# Patient Record
Sex: FEMALE | Race: WHITE | Employment: FULL TIME | URBAN - METROPOLITAN AREA
[De-identification: names, ages, dates, MRNs, and addresses within clinical notes are randomized per-mention and may not be internally consistent; named-entity substitution may affect disease eponyms.]

---

## 2017-08-09 ENCOUNTER — TELEPHONE (OUTPATIENT)
Dept: ENDOCRINOLOGY | Age: 57
End: 2017-08-09

## 2017-08-09 NOTE — TELEPHONE ENCOUNTER
Received a message from Dr. Pina Dia that pt had a repeat CT scan of her carotids that commented on a 3.7 cm right lobe thyroid nodule. I called and spoke with Dr. Pina Dia about this and told him that I had previously seen her one time in Feb 2014 for this and had serial ultrasounds in Feb 2014, August 2014, and August 2015 that showed the nodule to be stable at 3.4 cm. Given that there has only been 3 mm of growth in 2 years, I do not consider this very significant and do not feel she needs to come back and see me. I asked him to have his office staff call her to tell her that she won't need to be seen and he told me he would do so.

## 2018-02-11 ENCOUNTER — HOSPITAL ENCOUNTER (EMERGENCY)
Age: 58
Discharge: HOME | End: 2018-02-11
Attending: EMERGENCY MEDICINE
Payer: COMMERCIAL

## 2018-02-11 VITALS
OXYGEN SATURATION: 97 % | RESPIRATION RATE: 18 BRPM | HEIGHT: 59 IN | HEART RATE: 66 BPM | WEIGHT: 194.45 LBS | BODY MASS INDEX: 39.2 KG/M2 | DIASTOLIC BLOOD PRESSURE: 41 MMHG | TEMPERATURE: 97.9 F | SYSTOLIC BLOOD PRESSURE: 118 MMHG

## 2018-02-11 DIAGNOSIS — S33.6XXA SPRAIN OF SACROILIAC LIGAMENT, INITIAL ENCOUNTER: Primary | ICD-10-CM

## 2018-02-11 PROCEDURE — 99282 EMERGENCY DEPT VISIT SF MDM: CPT

## 2018-02-11 RX ORDER — CYCLOBENZAPRINE HCL 10 MG
10 TABLET ORAL
Qty: 20 TAB | Refills: 0 | Status: SHIPPED | OUTPATIENT
Start: 2018-02-11 | End: 2018-08-21

## 2018-02-11 RX ORDER — NAPROXEN 500 MG/1
500 TABLET ORAL
Qty: 20 TAB | Refills: 0 | Status: SHIPPED | OUTPATIENT
Start: 2018-02-11 | End: 2018-08-21

## 2018-02-11 NOTE — ED PROVIDER NOTES
EMERGENCY DEPARTMENT HISTORY AND PHYSICAL EXAM      Date: 2/11/2018  Patient Name: Kati Arenas    History of Presenting Illness     Chief Complaint   Patient presents with    Back Pain     lower back pain \"it feels like a pinched nerve\" onset yesterday       History Provided By: Patient    HPI: Kati Arenas, 59 y.o. female with PMHx significant for Arthritis, PVD, and HTN, presents ambulatory to the ED with cc of lower back pain since 9:00 AM yesterday morning. Pt states that her pain is exacerbated with breathing. Pt reports that she has been taking Hydrocodone which has helped to alleviate her symptoms, but she notes that she ran out of the medication. Pt explains that she was at work yesterday when she began to experience a gradual onset of lower back pain. Pt notes that she moved a heavy piece of furniture around her home approximately 3 days ago, but she denies any other recent strenuous activity. She specifically denies any fevers, chills, nausea, vomiting, chest pain, shortness of breath, headache, rash, diarrhea, sweating or weight loss. PCP: Nicholas Pope MD    There are no other complaints, changes, or physical findings at this time. Current Outpatient Prescriptions   Medication Sig Dispense Refill    naproxen (NAPROSYN) 500 mg tablet Take 1 Tab by mouth every twelve (12) hours as needed for Pain. 20 Tab 0    cyclobenzaprine (FLEXERIL) 10 mg tablet Take 1 Tab by mouth three (3) times daily as needed for Muscle Spasm(s). 20 Tab 0    oxyCODONE-acetaminophen (PERCOCET) 5-325 mg per tablet Take 1 Tab by mouth every six (6) hours as needed for Pain. Max Daily Amount: 4 Tabs. 10 Tab 0    methylPREDNISolone (MEDROL, TRACE,) 4 mg tablet Take as directed 1 Dose Pack 0    lisinopril (PRINIVIL, ZESTRIL) 10 mg tablet Take  by mouth daily.  CALCIUM CARBONATE (CALCIUM 600 PO) Take 1,200 mg by mouth daily.  cholecalciferol (VITAMIN D3) 1,000 unit tablet Take  by mouth daily.       melatonin 3 mg tablet Take  by mouth. 1/2 tablet qhs      ACETAMINOPHEN/DIPHENHYDRAMINE (TYLENOL PM PO) Take 1 Tab by mouth nightly as needed.  isosorbide mononitrate ER (IMDUR) 30 mg tablet Take  by mouth daily.  atorvastatin (LIPITOR) 80 mg tablet Take 80 mg by mouth daily.  metoprolol-XL (TOPROL XL) 50 mg XL tablet Take 50 mg by mouth daily.  aspirin (ASPIRIN) 325 mg tablet Take 325 mg by mouth daily.            Past History     Past Medical History:  Past Medical History:   Diagnosis Date    Arrhythmia     Arthritis     CAD (coronary artery disease)     GERD (gastroesophageal reflux disease)     Heart failure (HCC)     Hypertension     Other ill-defined conditions(799.89)     epidural injections    Psychiatric disorder     anxiety/depression    PVD (peripheral vascular disease) ([facility] [address]     Right thyroid nodule 1/22/2014       Past Surgical History:  Past Surgical History:   Procedure Laterality Date    CABG, ARTERY-VEIN, THREE  Nov 2010    COLONOSCOPY,DIAGNOSTIC  3/6/2015         HX BACK SURGERY      microdiskectomy 2009 L4-L5, and in 2010    HX GYN      D & C    HX GYN      c sections    HX HEENT      septoplasty    HX HYSTEROSCOPY WITH ENDOMETRIAL ABLATION      HX IMPLANTABLE CARDIOVERTER DEFIBRILLATOR  Feb 2011    HX ORTHOPAEDIC      surgery on right hand with tendon cut and repaired    HX OTHER SURGICAL      angioplasty-left iliac    HX OTHER SURGICAL      EGD-Anand's Esophagus    HX TONSILLECTOMY         Family History:  Family History   Problem Relation Age of Onset    Arthritis-rheumatoid Mother     Elevated Lipids Mother     Thyroid Disease Mother      hypothyroidism    Alcohol abuse Father     Elevated Lipids Father     Stroke Father     Heart Attack Brother 50    Heart Disease Maternal Grandmother     Hypertension Maternal Grandmother     Thyroid Disease Sister      hypothyroidism       Social History:  Social History   Substance Use Topics    Smoking status: Former Smoker     Packs/day: 1.00     Years: 37.00     Quit date: 1/1/2010    Smokeless tobacco: Never Used    Alcohol use No       Allergies:  No Known Allergies      Review of Systems   Review of Systems   Constitutional: Negative. Negative for activity change, appetite change, chills, fatigue, fever and unexpected weight change. HENT: Negative. Negative for congestion, hearing loss, rhinorrhea, sneezing and voice change. Eyes: Negative. Negative for pain and visual disturbance. Respiratory: Negative. Negative for apnea, cough, choking, chest tightness and shortness of breath. Cardiovascular: Negative. Negative for chest pain and palpitations. Gastrointestinal: Negative. Negative for abdominal distention, abdominal pain, blood in stool, diarrhea, nausea and vomiting. Genitourinary: Negative. Negative for difficulty urinating, flank pain, frequency and urgency. No discharge   Musculoskeletal: Positive for back pain. Negative for arthralgias, myalgias and neck stiffness. Skin: Negative. Negative for color change and rash. Neurological: Negative. Negative for dizziness, seizures, syncope, speech difficulty, weakness, numbness and headaches. Hematological: Negative for adenopathy. Psychiatric/Behavioral: Negative. Negative for agitation, behavioral problems, dysphoric mood and suicidal ideas. The patient is not nervous/anxious. Physical Exam   Physical Exam   Constitutional: She is oriented to person, place, and time. She appears well-developed and well-nourished. No distress. HENT:   Head: Normocephalic and atraumatic. Mouth/Throat: Oropharynx is clear and moist. No oropharyngeal exudate. Eyes: Conjunctivae and EOM are normal. Pupils are equal, round, and reactive to light. Right eye exhibits no discharge. Left eye exhibits no discharge. Neck: Normal range of motion. Neck supple. Cardiovascular: Normal rate, regular rhythm and intact distal pulses.   Exam reveals no gallop and no friction rub. No murmur heard. Pulmonary/Chest: Effort normal and breath sounds normal. No respiratory distress. She has no wheezes. She has no rales. She exhibits no tenderness. Abdominal: Soft. Bowel sounds are normal. She exhibits no distension and no mass. There is no tenderness. There is no rebound and no guarding. Musculoskeletal: Normal range of motion. She exhibits no edema. TTP over the SI joints   Lymphadenopathy:     She has no cervical adenopathy. Neurological: She is alert and oriented to person, place, and time. No cranial nerve deficit. Coordination normal.   Skin: Skin is warm and dry. No rash noted. No erythema. Psychiatric: She has a normal mood and affect. Nursing note and vitals reviewed. Medical Decision Making   I am the first provider for this patient. I reviewed the vital signs, available nursing notes, past medical history, past surgical history, family history and social history. Vital Signs-Reviewed the patient's vital signs. Patient Vitals for the past 12 hrs:   Temp Pulse Resp BP SpO2   02/11/18 1226 97.9 °F (36.6 °C) 66 18 118/41 97 %       Pulse Oximetry Analysis - 97% on RA    Cardiac Monitor:   Rate: 66 bpm  Rhythm: Normal Sinus Rhythm     Records Reviewed: Nursing Notes, Old Medical Records, Previous Radiology Studies and Previous Laboratory Studies    Provider Notes (Medical Decision Making):   DDx: strain, sprain, SI joint dysfunction    ED Course:   Initial assessment performed. The patients presenting problems have been discussed, and they are in agreement with the care plan formulated and outlined with them. I have encouraged them to ask questions as they arise throughout their visit. Disposition:  1:16 PM  Maddy Cardoso's  results have been reviewed with her. She has been counseled regarding her diagnosis.   She verbally conveys understanding and agreement of the signs, symptoms, diagnosis, treatment and prognosis and additionally agrees to follow up as recommended with Dr. Dorothy Zelaya MD in 24 - 48 hours. She also agrees with the care-plan and conveys that all of her questions have been answered. I have also put together some discharge instructions for her that include: 1) educational information regarding their diagnosis, 2) how to care for their diagnosis at home, as well a 3) list of reasons why they would want to return to the ED prior to their follow-up appointment, should their condition change. PLAN:  1. Discharge Medication List as of 2/11/2018  1:16 PM      START taking these medications    Details   naproxen (NAPROSYN) 500 mg tablet Take 1 Tab by mouth every twelve (12) hours as needed for Pain., Normal, Disp-20 Tab, R-0      cyclobenzaprine (FLEXERIL) 10 mg tablet Take 1 Tab by mouth three (3) times daily as needed for Muscle Spasm(s). , Normal, Disp-20 Tab, R-0         CONTINUE these medications which have NOT CHANGED    Details   oxyCODONE-acetaminophen (PERCOCET) 5-325 mg per tablet Take 1 Tab by mouth every six (6) hours as needed for Pain. Max Daily Amount: 4 Tabs., Print, Disp-10 Tab, R-0      methylPREDNISolone (MEDROL, TRACE,) 4 mg tablet Take as directed, Print, Disp-1 Dose Pack, R-0      lisinopril (PRINIVIL, ZESTRIL) 10 mg tablet Take  by mouth daily. , Historical Med      CALCIUM CARBONATE (CALCIUM 600 PO) Take 1,200 mg by mouth daily. , Historical Med      cholecalciferol (VITAMIN D3) 1,000 unit tablet Take  by mouth daily. , Historical Med      melatonin 3 mg tablet Take  by mouth. 1/2 tablet qhs, Historical Med      ACETAMINOPHEN/DIPHENHYDRAMINE (TYLENOL PM PO) Take 1 Tab by mouth nightly as needed., Historical Med      isosorbide mononitrate ER (IMDUR) 30 mg tablet Take  by mouth daily. , Historical Med      atorvastatin (LIPITOR) 80 mg tablet Take 80 mg by mouth daily. , Historical Med      metoprolol-XL (TOPROL XL) 50 mg XL tablet Take 50 mg by mouth daily.   , Historical Med      aspirin (ASPIRIN) 325 mg tablet Take 325 mg by mouth daily. , Historical Med           2. Follow-up Information     Follow up With Details Comments Contact Madelyn Liao MD Call in 2 days As needed, If symptoms worsen [place] [address]  [phone]          Return to ED if worse     Diagnosis     Clinical Impression:   1. Sprain of sacroiliac ligament, initial encounter        Attestations: This note is prepared by Summer Gill, acting as Scribe for [address] Brennan Swanson, [address] [facility] Brennan Swanson MD: The scribe's documentation has been prepared under my direction and personally reviewed by me in its entirety. I confirm that the note above accurately reflects all work, treatment, procedures, and medical decision making performed by me.

## 2018-02-11 NOTE — ED NOTES
Pt. States she hurt her back that started yesterday at 0900 and continues to get worse. Pt. Picked up a piece of furniture on Friday   Pt. Took hydrocodone (from previous back issues) yesterday at 1400 and took anotherone at 0300.

## 2018-02-11 NOTE — DISCHARGE INSTRUCTIONS
Sacroiliac Joint Pain: Care Instructions  Your Care Instructions    The sacroiliac joints connect the spine and each side of the pelvis. These joints bear the weight and stress of your torso. This makes them easy to injure. Injury or overuse of these joints may cause low back pain. Stress on these joints can cause joint pain. Sacroiliac joint pain is more common in pregnant women. Certain kinds of arthritis also may cause this type of joint pain. Home treatment may help you feel better. So can avoiding activities that stress your back. Your doctor also may recommend physical therapy. This may include doing exercises and stretches to help with pain. You may also learn to use good posture. Follow-up care is a key part of your treatment and safety. Be sure to make and go to all appointments, and call your doctor if you are having problems. It's also a good idea to know your test results and keep a list of the medicines you take. How can you care for yourself at home? · Ask your doctor about light exercises that may help your back pain. Try to do light activity throughout the day. But make sure to take rests as needed. Find a comfortable position for rest, but don't stay in one position for too long. Avoid activities that cause pain. · To apply heat, put a warm water bottle, a heating pad set on low, or a warm cloth on your back. Do not go to sleep with a heating pad on your skin. · Put ice or a cold pack on your back for 10 to 20 minutes at a time. Put a thin cloth between the ice and your skin. · If the doctor gave you a prescription medicine for pain, take it as prescribed. · If you are not taking a prescription pain medicine, ask your doctor if you can take an over-the-counter pain medicine, such as acetaminophen (Tylenol), ibuprofen (Advil, Motrin), or naproxen (Aleve). Read and follow all instructions on the label. Take pain medicines exactly as directed.   · Do not take two or more pain medicines at the same time unless the doctor told you to. Many pain medicines have acetaminophen, which is Tylenol. Too much acetaminophen (Tylenol) can be harmful. · To prevent future back pain, do exercises to stretch and strengthen your back and stomach. Learn how to use good posture, safe lifting techniques, and proper body mechanics. When should you call for help? Call 911 anytime you think you may need emergency care. For example, call if:  ? · You are unable to move a leg at all. ?Call your doctor now or seek immediate medical care if:  ? · You have new or worse symptoms in your legs or buttocks. Symptoms may include:  ¨ Numbness or tingling. ¨ Weakness. ¨ Pain. ? · You lose bladder or bowel control. ? Watch closely for changes in your health, and be sure to contact your doctor if:  ? · You are not getting better as expected. Where can you learn more? Go to http://christin-bella.info/. Enter U931 in the search box to learn more about \"Sacroiliac Joint Pain: Care Instructions. \"  Current as of: March 21, 2017  Content Version: 11.4  © 7113-2592 [employer]. Care instructions adapted under license by [employer] (which disclaims liability or warranty for this information). If you have questions about a medical condition or this instruction, always ask your healthcare professional. [address] any warranty or liability for your use of this information.

## 2018-08-21 ENCOUNTER — HOSPITAL ENCOUNTER (OUTPATIENT)
Dept: NON INVASIVE DIAGNOSTICS | Age: 58
Discharge: HOME | End: 2018-08-21
Attending: INTERNAL MEDICINE | Admitting: INTERNAL MEDICINE
Payer: COMMERCIAL

## 2018-08-21 ENCOUNTER — APPOINTMENT (OUTPATIENT)
Dept: GENERAL RADIOLOGY | Age: 58
End: 2018-08-21
Attending: INTERNAL MEDICINE
Payer: COMMERCIAL

## 2018-08-21 VITALS
OXYGEN SATURATION: 95 % | HEIGHT: 59 IN | DIASTOLIC BLOOD PRESSURE: 32 MMHG | SYSTOLIC BLOOD PRESSURE: 106 MMHG | RESPIRATION RATE: 14 BRPM | TEMPERATURE: 98.5 F | WEIGHT: 190 LBS | BODY MASS INDEX: 38.3 KG/M2 | HEART RATE: 71 BPM

## 2018-08-21 PROBLEM — Z95.0 CARDIAC PACEMAKER: Status: ACTIVE | Noted: 2018-08-21

## 2018-08-21 PROCEDURE — C1894 INTRO/SHEATH, NON-LASER: HCPCS

## 2018-08-21 PROCEDURE — 77030039266 HC ADH SKN EXOFIN S2SG -A

## 2018-08-21 PROCEDURE — 74011250636 HC RX REV CODE- 250/636

## 2018-08-21 PROCEDURE — 77030018729 HC ELECTRD DEFIB PAD CARD -B

## 2018-08-21 PROCEDURE — 77030028698 HC BLD TISS PLSM MEDT -D

## 2018-08-21 PROCEDURE — 77030031139 HC SUT VCRL2 J&J -A

## 2018-08-21 PROCEDURE — C1892 INTRO/SHEATH,FIXED,PEEL-AWAY: HCPCS

## 2018-08-21 PROCEDURE — 71045 X-RAY EXAM CHEST 1 VIEW: CPT

## 2018-08-21 PROCEDURE — 77030002996 HC SUT SLK J&J -A

## 2018-08-21 PROCEDURE — 74011000250 HC RX REV CODE- 250

## 2018-08-21 PROCEDURE — 77030018836 HC SOL IRR NACL ICUM -A

## 2018-08-21 PROCEDURE — 77030018673

## 2018-08-21 PROCEDURE — 33207 INSERT HEART PM VENTRICULAR: CPT

## 2018-08-21 PROCEDURE — C1785 PMKR, DUAL, RATE-RESP: HCPCS

## 2018-08-21 RX ORDER — MIDAZOLAM HYDROCHLORIDE 1 MG/ML
1-5 INJECTION, SOLUTION INTRAMUSCULAR; INTRAVENOUS
Status: DISCONTINUED | OUTPATIENT
Start: 2018-08-21 | End: 2018-08-21

## 2018-08-21 RX ORDER — SODIUM CHLORIDE 0.9 % (FLUSH) 0.9 %
5-10 SYRINGE (ML) INJECTION EVERY 8 HOURS
Status: DISCONTINUED | OUTPATIENT
Start: 2018-08-21 | End: 2018-08-22 | Stop reason: HOSPADM

## 2018-08-21 RX ORDER — FENTANYL CITRATE 50 UG/ML
12.5-5 INJECTION, SOLUTION INTRAMUSCULAR; INTRAVENOUS
Status: DISCONTINUED | OUTPATIENT
Start: 2018-08-21 | End: 2018-08-21

## 2018-08-21 RX ORDER — CEPHALEXIN 500 MG/1
500 CAPSULE ORAL 3 TIMES DAILY
Qty: 15 CAP | Refills: 0 | Status: SHIPPED | OUTPATIENT
Start: 2018-08-21 | End: 2018-08-26

## 2018-08-21 RX ORDER — VALSARTAN 40 MG/1
50 TABLET ORAL 2 TIMES DAILY
COMMUNITY
End: 2019-05-30

## 2018-08-21 RX ORDER — OXYCODONE AND ACETAMINOPHEN 5; 325 MG/1; MG/1
1 TABLET ORAL
Status: DISCONTINUED | OUTPATIENT
Start: 2018-08-21 | End: 2018-08-22 | Stop reason: HOSPADM

## 2018-08-21 RX ORDER — ONDANSETRON 2 MG/ML
4 INJECTION INTRAMUSCULAR; INTRAVENOUS
Status: DISCONTINUED | OUTPATIENT
Start: 2018-08-21 | End: 2018-08-22 | Stop reason: HOSPADM

## 2018-08-21 RX ORDER — CEFAZOLIN SODIUM/WATER 2 G/20 ML
SYRINGE (ML) INTRAVENOUS
Status: COMPLETED
Start: 2018-08-21 | End: 2018-08-21

## 2018-08-21 RX ORDER — HEPARIN SODIUM 200 [USP'U]/100ML
INJECTION, SOLUTION INTRAVENOUS
Status: COMPLETED
Start: 2018-08-21 | End: 2018-08-21

## 2018-08-21 RX ORDER — MIDAZOLAM HYDROCHLORIDE 1 MG/ML
INJECTION, SOLUTION INTRAMUSCULAR; INTRAVENOUS
Status: COMPLETED
Start: 2018-08-21 | End: 2018-08-21

## 2018-08-21 RX ORDER — CEFAZOLIN SODIUM/WATER 2 G/20 ML
2 SYRINGE (ML) INTRAVENOUS
Status: COMPLETED | OUTPATIENT
Start: 2018-08-21 | End: 2018-08-21

## 2018-08-21 RX ORDER — FENTANYL CITRATE 50 UG/ML
INJECTION, SOLUTION INTRAMUSCULAR; INTRAVENOUS
Status: COMPLETED
Start: 2018-08-21 | End: 2018-08-21

## 2018-08-21 RX ORDER — BACITRACIN 50000 [IU]/1
INJECTION, POWDER, FOR SOLUTION INTRAMUSCULAR
Status: COMPLETED
Start: 2018-08-21 | End: 2018-08-21

## 2018-08-21 RX ORDER — SODIUM CHLORIDE 0.9 % (FLUSH) 0.9 %
5-10 SYRINGE (ML) INJECTION AS NEEDED
Status: DISCONTINUED | OUTPATIENT
Start: 2018-08-21 | End: 2018-08-22 | Stop reason: HOSPADM

## 2018-08-21 RX ORDER — ACETAMINOPHEN 325 MG/1
650 TABLET ORAL
Status: DISCONTINUED | OUTPATIENT
Start: 2018-08-21 | End: 2018-08-22 | Stop reason: HOSPADM

## 2018-08-21 RX ORDER — LIDOCAINE HYDROCHLORIDE AND EPINEPHRINE 10; 10 MG/ML; UG/ML
INJECTION, SOLUTION INFILTRATION; PERINEURAL
Status: COMPLETED
Start: 2018-08-21 | End: 2018-08-21

## 2018-08-21 RX ORDER — LIDOCAINE HYDROCHLORIDE AND EPINEPHRINE 10; 10 MG/ML; UG/ML
1-20 INJECTION, SOLUTION INFILTRATION; PERINEURAL
Status: DISCONTINUED | OUTPATIENT
Start: 2018-08-21 | End: 2018-08-21

## 2018-08-21 RX ORDER — HEPARIN SODIUM 200 [USP'U]/100ML
500 INJECTION, SOLUTION INTRAVENOUS ONCE
Status: COMPLETED | OUTPATIENT
Start: 2018-08-21 | End: 2018-08-21

## 2018-08-21 RX ORDER — BACITRACIN 50000 [IU]/1
50000 INJECTION, POWDER, FOR SOLUTION INTRAMUSCULAR ONCE
Status: COMPLETED | OUTPATIENT
Start: 2018-08-21 | End: 2018-08-21

## 2018-08-21 RX ADMIN — MIDAZOLAM HYDROCHLORIDE 2 MG: 1 INJECTION, SOLUTION INTRAMUSCULAR; INTRAVENOUS at 15:48

## 2018-08-21 RX ADMIN — HEPARIN SODIUM 1000 UNITS: 200 INJECTION, SOLUTION INTRAVENOUS at 16:22

## 2018-08-21 RX ADMIN — BACITRACIN 50000 UNITS: 5000 INJECTION, POWDER, FOR SOLUTION INTRAMUSCULAR at 16:21

## 2018-08-21 RX ADMIN — LIDOCAINE HYDROCHLORIDE AND EPINEPHRINE 20 MG: 10; 10 INJECTION, SOLUTION INFILTRATION; PERINEURAL at 16:02

## 2018-08-21 RX ADMIN — FENTANYL CITRATE 25 MCG: 50 INJECTION, SOLUTION INTRAMUSCULAR; INTRAVENOUS at 16:22

## 2018-08-21 RX ADMIN — MIDAZOLAM HYDROCHLORIDE 2 MG: 1 INJECTION, SOLUTION INTRAMUSCULAR; INTRAVENOUS at 16:05

## 2018-08-21 RX ADMIN — FENTANYL CITRATE 25 MCG: 50 INJECTION, SOLUTION INTRAMUSCULAR; INTRAVENOUS at 16:06

## 2018-08-21 RX ADMIN — MIDAZOLAM HYDROCHLORIDE 2 MG: 1 INJECTION, SOLUTION INTRAMUSCULAR; INTRAVENOUS at 16:22

## 2018-08-21 RX ADMIN — Medication 2 G: at 15:45

## 2018-08-21 RX ADMIN — MIDAZOLAM 2 MG: 1 INJECTION INTRAMUSCULAR; INTRAVENOUS at 15:48

## 2018-08-21 RX ADMIN — FENTANYL CITRATE 50 MCG: 50 INJECTION, SOLUTION INTRAMUSCULAR; INTRAVENOUS at 15:49

## 2018-08-21 RX ADMIN — BACITRACIN 50000 UNITS: 50000 INJECTION, POWDER, FOR SOLUTION INTRAMUSCULAR at 16:21

## 2018-08-21 NOTE — H&P
[address]  HISTORY AND PHYSICAL      Jakub Ordoñez  MR#: [id number]  : 1960  ACCOUNT #: [de-identified]   ADMIT DATE: 2018    HISTORY OF PRESENT ILLNESS:  This is a 78-year-old female with a history of a chronic ischemic cardiomyopathy, remote coronary artery bypass grafting, hypertension, dyslipidemia, peripheral vascular disease, who is here for further evaluation and management. I last saw her in the office in July, at which point her defibrillator battery was running low. She had a prophylactic defibrillator implanted in . Her ejection fraction was around 30% at that time. In  prior to coronary artery bypass grafting, her ejection fraction was 10%. By 2017, her ejection fraction micaela to 50% and most recently by echo 60% prior to this procedure. She has not had any malignant ventricular arrhythmias. ALLERGIES:  NO KNOWN DRUG ALLERGIES. MEDICATIONS:  Please see the list.    FAMILY HISTORY:  Noncontributory. SOCIAL HISTORY:  . Former smoker. REVIEW OF SYSTEMS:  Noncontributory. PHYSICAL EXAMINATION:  VITAL SIGNS:  Stable. GENERAL:  She is awake, alert. Grossly nonfocal.    CHEST:  The left ICD chest site looks good. No erosion. TELEMETRY:  Atrial pacing. IMPRESSION:  1. Chronic ischemic cardiomyopathy with recovery of function documented by echo. Her ejection fraction is 60%. At its lowest in  prior to bypass grafting, it was 10%. 2.  As above. RECOMMENDATIONS:  Given the potential benefits, risks, and alternatives, she agrees to proceed with device generator change which will involve downgrading from a defibrillator to a pacemaker. She has no evident need for a prophylactic defibrillator at this time but does use atrial pacing for sinus node dysfunction. MD NICKOLAS Calvo/  D: 2018 15:52     T: 2018 16:22  JOB #: [id number]  CC: Diogo Brown MD

## 2018-08-21 NOTE — PROGRESS NOTES
Cardiac Cath Lab Recovery Arrival Note:      Mikell Gitelman arrived to Cardiac Cath Lab, Recovery Area. Staff introduced to patient. Patient identifiers verified with NAME and DATE OF BIRTH. Procedure verified with patient. Consent forms reviewed and signed by patient or authorized representative and verified. Allergies verified. Patient and family oriented to department. Patient and family informed of procedure and plan of care. Questions answered with review. Patient prepped for procedure, per orders from physician, prior to arrival.    Patient on cardiac monitor, non-invasive blood pressure, SPO2 monitor. On RA. Patient is A&Ox 4. Patient reports no complaints. Patient in stretcher, in low position, with side rails up, call bell within reach, patient instructed to call if assistance as needed. Patient prep in: [address], [place] [address].    Prep by: Suma Duarte

## 2018-08-21 NOTE — PROGRESS NOTES
S/p ICD removal and pacer gen placement due to battery depletion, sick sinus syndrome, recovered EF to 60%.

## 2018-08-21 NOTE — IP AVS SNAPSHOT
[address] [facility] 
[phone] Patient: Madhu Conroy MRN: [id number] VWT:7/30/4094 About your hospitalization You were admitted on:  August 21, 2018 You last received care in the:  MRM 2 INTRVNTNL CARDIO You were discharged on:  August 21, 2018 Why you were hospitalized Your primary diagnosis was:  Not on File Your diagnoses also included:  Cardiac Pacemaker Follow-up Information Follow up With Details Comments Contact Info [address], [address] [facility] [address] [phone] Yesica Cobian MD  Please make appointment in 2-4 weeks with Dr. Chelsea Oakley nurse, [place]. [address] [facility] 
[phone] Discharge Orders None A check dionne indicates which time of day the medication should be taken. My Medications START taking these medications Instructions Each Dose to Equal  
 Morning Noon Evening Bedtime  
 cephALEXin 500 mg capsule Commonly known as:  Vickie Rota Your last dose was: Your next dose is: Take 1 Cap by mouth three (3) times daily for 5 days. 500 mg CONTINUE taking these medications Instructions Each Dose to Equal  
 Morning Noon Evening Bedtime  
 aspirin 325 mg tablet Commonly known as:  ASPIRIN Your last dose was: Your next dose is: Take 325 mg by mouth daily. 325 mg CALCIUM 600 PO Your last dose was: Your next dose is: Take 1,200 mg by mouth daily. 1200 mg  
    
   
   
   
  
 isosorbide mononitrate ER 30 mg tablet Commonly known as:  IMDUR Your last dose was: Your next dose is: Take  by mouth daily. LIPITOR 80 mg tablet Generic drug:  atorvastatin Your last dose was: Your next dose is: Take 80 mg by mouth daily. 80 mg  
    
   
   
   
  
 melatonin 3 mg tablet Your last dose was: Your next dose is: Take  by mouth. 1/2 tablet qhs  
     
   
   
   
  
 TOPROL XL 50 mg XL tablet Generic drug:  metoprolol succinate Your last dose was: Your next dose is: Take 50 mg by mouth daily. 50 mg  
    
   
   
   
  
 TYLENOL PM PO Your last dose was: Your next dose is: Take 1 Tab by mouth nightly as needed. 1 Tab  
    
   
   
   
  
 valsartan 40 mg tablet Commonly known as:  DIOVAN Your last dose was: Your next dose is: Take 50 mg by mouth two (2) times a day. Indications: entresto 49/51 bid 50 mg  
    
   
   
   
  
 VITAMIN D3 1,000 unit tablet Generic drug:  cholecalciferol Your last dose was: Your next dose is: Take  by mouth daily. STOP taking these medications   
 cyclobenzaprine 10 mg tablet Commonly known as:  FLEXERIL  
   
  
 lisinopril 10 mg tablet Commonly known as:  PRINIVIL, ZESTRIL  
   
  
 naproxen 500 mg tablet Commonly known as:  NAPROSYN  
   
  
 oxyCODONE-acetaminophen 5-325 mg per tablet Commonly known as:  PERCOCET Where to Get Your Medications These medications were sent to [facility], [address] [place] [address] [address] [address] Phone:  [phone]  
  cephALEXin 500 mg capsule Discharge Instructions None Introducing [facility] [place] [facility]! Dear Naveed Cazares: Thank you for requesting a [employer] account. Our records indicate that you already have an active [employer] account. You can access your account anytime at [email or web address] Did you know that you can access your hospital and ER discharge instructions at any time in [employer]? You can also review all of your test results from your hospital stay or ER visit. Additional Information If you have questions, please visit the Frequently Asked Questions section of the Andre Phillipet website at [email or web address] Remember, Andre Phillipet is NOT to be used for urgent needs. For medical emergencies, dial 911. Now available from your iPhone and Android! Introducing Yg Carmichael As a [facility] patient, I wanted to make you aware of our electronic visit tool called Yg Carmichael. RodriguezPIST[employer] Consult 24/7 allows you to connect within minutes with a medical provider 24 hours a day, seven days a week via a mobile device or tablet or logging into a secure website from your computer. You can access Yg Carmichael from anywhere in the [place]. A virtual visit might be right for you when you have a simple condition and feel like you just dont want to get out of bed, or cant get away from work for an appointment, when your regular [facility] provider is not available (evenings, weekends or holidays), or when youre out of town and need minor care. Electronic visits cost only $49 and if the RodriguezPISTIS Consult 24/[employer] provider determines a prescription is needed to treat your condition, one can be electronically transmitted to a nearby pharmacy*. Please take a moment to enroll today if you have not already done so. The enrollment process is free and takes just a few minutes. To enroll, please download the Clinical Insight 24/[employer] paulette to your tablet or phone, or visit [email or web address] to enroll on your computer. And, as an [address] [place] [address] patient with a [facility] account, the results of your visits will be scanned into your electronic medical record and your primary care provider will be able to view the scanned results.    
We urge you to continue to see your regular [facility] provider for your ongoing medical care. And while your primary care provider may not be the one available when you seek a Yg Navarrokavyafin virtual visit, the peace of mind you get from getting a real diagnosis real time can be priceless. For more information on Yg Navarrokavyafin, view our Frequently Asked Questions (FAQs) at [email or web address]. Sincerely, 
 
Manuel Gama MD 
Chief Medical Officer [employer] *:  certain medications cannot be prescribed via Yg Navarrodaisy Providers Seen During Your Hospitalization Provider Specialty Primary office phone Juliet Penn MD Cardiology [phone] Your Primary Care Physician (PCP) Primary Care Physician Office Phone Office Fax Fahad Girardangelique [phone] You are allergic to the following No active allergies Recent Documentation Height Weight BMI OB Status Smoking Status 1.499 m 86.2 kg 38.38 kg/m2 Postmenopausal Former Smoker Emergency Contacts Name Discharge Info Relation Home Work Mobile Rajendra Cardoso DISCHARGE CAREGIVER [3] Spouse [3] [phone] [address] [facility] [address] CAREGIVER [3] Daughter [21] [phone] Patient Belongings The following personal items are in your possession at time of discharge: 
  Dental Appliances: None         Home Medications: None   Jewelry: Ring, With patient  Clothing: Footwear, Pants, Shirt, Undergarments    Other Valuables: None Please provide this summary of care documentation to your next provider. Signatures-by signing, you are acknowledging that this After Visit Summary has been reviewed with you and you have received a copy. Patient Signature:  ____________________________________________________________ Date:  ____________________________________________________________  
  
Aloma Snellen  Provider Signature: ____________________________________________________________ Date:  ____________________________________________________________

## 2018-08-21 NOTE — PROCEDURES
[address]  [phone]    Patient ID:  Patient: Marco Antonio Sanchez  MRN: [id number]  Age: 59 y.o.  : 1960  Gender: female  Study Date: 2018    History: This is a female with nonreversible sick sinus syndrome with bradycardia requiring pacing, and a dual chamber ICD now with battery depletion. She is here for ICD generator removal and replacement with a dual chamber pacemaker generator. She no longer needs a prophylactic ICD since she has had an improvement in ejection fraction to 60% but does require pacing. Procedures Performed:  1. DUAL CHAMBER PACEMAKER Generator Insertion (83762)  2. ICD generator removal (15317)    The patient was brought to the EP lab in a postabsorptive state after informed consent had been previously obtained. Continuous electrocardiographic and hemodynamic monitoring was performed. Sedation was performed by the EP nurse who was in constant attendance and my supervision throughout the procedure using Versed and fentanyl, sedation time 30 minutes. Using 1% lidocaine with epinephrine, the left chest site was anesthetized at the previous incision scar. The pocket was opened in the usual fashion and using blunt dissection, minimal scar was removed. The old ICD generator was ultimately delivered from the pocket after disconnecting from the leads. Each lead was tested and performance verified. Hemostasis was obtained using a Plasma Blade at recommended settings. The new pacemaker pulse generator was connected to the right atrial and right ventricular leads. The SVC and RV DF-1 portions were capped, each cap secured with 0-silk suture ties. The leads and caps were coiled under the generator and placed in the pocket. Vigorous irrigation with antibiotic solution was performed.      The pocket was closed using a running 2-0 Vicryl layer x1, followed by a more superficial layer of running 4-0 Vicryl in a subcuticular fashion to close the skin. Dermabond was applied. Preoperative Diagnosis: As above. Postoperative Diagnosis: As above. Procedure:  As above. Surgeon(s) and Role:  Skylar Reyes MD - Primary   Anesthesia:   MAC. Estimated Blood Loss:  <5 cc. Specimens: * No specimens in log *   Findings:  As below. Complications:  None. SETTINGS:  Medtronic [id number], [id number]  RA 1.9, 1.0, 532, chronic 5075-45 cm  RV 15.5, 1.0, 475, chronic 6947-58 cm  AAI-DDD       Explanted generator:  Medtronic [id number], [id number], implanted 2/3/2011       Recommendations:  After successful ICD generator removal and dual chamber pacemaker generator insertion, routine follow-up in 2-4 weeks for wound and device check.       Signed By: Skinny Lunsford MD     August 21, 2018

## 2018-08-21 NOTE — ROUTINE PROCESS
RN walked in as patient was coming back from restroom without assistance. She had been educated on bedrest and its purposes, and that it ended at 2045. I re-educated the pt and her family member on the need for and purpose of bedrest. Site CDI, no complaints.

## 2018-08-21 NOTE — PROGRESS NOTES
Doing well post-procedure. Home today with F/U routinely in the office. All questions answered. Cephalexin 500 tid x 5 days, e-rx'd.

## 2018-08-22 NOTE — PROGRESS NOTES
RN have reviewed medications and side effects with pt. Regarding new prescription. Discharge instruction reviewed with pt. And family. Pt. Demonstrates understanding. Allowed adequate time to ask questions, all questions answered. Printed copy of AVS give to pt. All belongings gathered. IV and Tele discontinue. Transported pt. Via wheelchair to main entrance and into car of family.

## 2019-03-01 ENCOUNTER — APPOINTMENT (OUTPATIENT)
Dept: BONE DENSITY | Age: 59
End: 2019-03-01
Attending: NURSE PRACTITIONER
Payer: COMMERCIAL

## 2019-03-01 ENCOUNTER — HOSPITAL ENCOUNTER (OUTPATIENT)
Dept: MAMMOGRAPHY | Age: 59
Discharge: HOME | End: 2019-03-01
Attending: FAMILY MEDICINE
Payer: COMMERCIAL

## 2019-03-01 ENCOUNTER — HOSPITAL ENCOUNTER (OUTPATIENT)
Dept: BONE DENSITY | Age: 59
Discharge: HOME | End: 2019-03-01
Attending: NURSE PRACTITIONER
Payer: COMMERCIAL

## 2019-03-01 DIAGNOSIS — Z78.0 MENOPAUSE: ICD-10-CM

## 2019-03-01 DIAGNOSIS — M85.80 OSTEOPENIA: ICD-10-CM

## 2019-03-01 DIAGNOSIS — Z12.39 SCREENING BREAST EXAMINATION: ICD-10-CM

## 2019-03-01 PROCEDURE — 77080 DXA BONE DENSITY AXIAL: CPT

## 2019-03-01 PROCEDURE — 77067 SCR MAMMO BI INCL CAD: CPT

## 2019-05-22 ENCOUNTER — HOSPITAL ENCOUNTER (OUTPATIENT)
Dept: CT IMAGING | Age: 59
Discharge: HOME | End: 2019-05-22
Attending: SURGERY
Payer: COMMERCIAL

## 2019-05-22 DIAGNOSIS — I65.23 BILATERAL CAROTID ARTERY OCCLUSION: ICD-10-CM

## 2019-05-22 LAB — CREAT BLD-MCNC: 1.2 MG/DL (ref 0.6–1.3)

## 2019-05-22 PROCEDURE — 70498 CT ANGIOGRAPHY NECK: CPT

## 2019-05-22 PROCEDURE — 74011636320 HC RX REV CODE- 636/320: Performed by: SURGERY

## 2019-05-22 PROCEDURE — 82565 ASSAY OF CREATININE: CPT

## 2019-05-22 RX ORDER — SODIUM CHLORIDE 0.9 % (FLUSH) 0.9 %
10 SYRINGE (ML) INJECTION
Status: COMPLETED | OUTPATIENT
Start: 2019-05-22 | End: 2019-05-22

## 2019-05-22 RX ADMIN — IOPAMIDOL 100 ML: 755 INJECTION, SOLUTION INTRAVENOUS at 16:18

## 2019-05-22 RX ADMIN — Medication 10 ML: at 16:18

## 2019-05-30 ENCOUNTER — HOSPITAL ENCOUNTER (OUTPATIENT)
Dept: GENERAL RADIOLOGY | Age: 59
Discharge: HOME | End: 2019-05-30
Attending: SURGERY
Payer: COMMERCIAL

## 2019-05-30 ENCOUNTER — HOSPITAL ENCOUNTER (OUTPATIENT)
Dept: PREADMISSION TESTING | Age: 59
Discharge: HOME | End: 2019-05-30
Attending: SURGERY
Payer: COMMERCIAL

## 2019-05-30 VITALS
WEIGHT: 190.92 LBS | HEART RATE: 59 BPM | RESPIRATION RATE: 18 BRPM | DIASTOLIC BLOOD PRESSURE: 45 MMHG | OXYGEN SATURATION: 100 % | TEMPERATURE: 98 F | BODY MASS INDEX: 38.49 KG/M2 | SYSTOLIC BLOOD PRESSURE: 131 MMHG | HEIGHT: 59 IN

## 2019-05-30 LAB
ABO + RH BLD: NORMAL
ALBUMIN SERPL-MCNC: 3.6 G/DL (ref 3.5–5)
ALBUMIN/GLOB SERPL: 1.1 {RATIO} (ref 1.1–2.2)
ALP SERPL-CCNC: 93 U/L (ref 45–117)
ALT SERPL-CCNC: 27 U/L (ref 12–78)
ANION GAP SERPL CALC-SCNC: 3 MMOL/L (ref 5–15)
APPEARANCE UR: CLEAR
APTT PPP: 27.4 SEC (ref 22.1–32)
AST SERPL-CCNC: 15 U/L (ref 15–37)
BACTERIA URNS QL MICRO: NEGATIVE /HPF
BILIRUB SERPL-MCNC: 0.4 MG/DL (ref 0.2–1)
BILIRUB UR QL: NEGATIVE
BLOOD GROUP ANTIBODIES SERPL: NORMAL
BUN SERPL-MCNC: 11 MG/DL (ref 6–20)
BUN/CREAT SERPL: 9 (ref 12–20)
CALCIUM SERPL-MCNC: 9.5 MG/DL (ref 8.5–10.1)
CHLORIDE SERPL-SCNC: 106 MMOL/L (ref 97–108)
CO2 SERPL-SCNC: 31 MMOL/L (ref 21–32)
COLOR UR: NORMAL
CREAT SERPL-MCNC: 1.24 MG/DL (ref 0.55–1.02)
EPITH CASTS URNS QL MICRO: NORMAL /LPF
ERYTHROCYTE [DISTWIDTH] IN BLOOD BY AUTOMATED COUNT: 12.9 % (ref 11.5–14.5)
GLOBULIN SER CALC-MCNC: 3.4 G/DL (ref 2–4)
GLUCOSE SERPL-MCNC: 137 MG/DL (ref 65–100)
GLUCOSE UR STRIP.AUTO-MCNC: NEGATIVE MG/DL
HCT VFR BLD AUTO: 37.4 % (ref 35–47)
HGB BLD-MCNC: 12.4 G/DL (ref 11.5–16)
HGB UR QL STRIP: NEGATIVE
HYALINE CASTS URNS QL MICRO: NORMAL /LPF (ref 0–5)
INR PPP: 1 (ref 0.9–1.1)
KETONES UR QL STRIP.AUTO: NEGATIVE MG/DL
LEUKOCYTE ESTERASE UR QL STRIP.AUTO: NEGATIVE
MCH RBC QN AUTO: 30.6 PG (ref 26–34)
MCHC RBC AUTO-ENTMCNC: 33.2 G/DL (ref 30–36.5)
MCV RBC AUTO: 92.3 FL (ref 80–99)
NITRITE UR QL STRIP.AUTO: NEGATIVE
NRBC # BLD: 0 K/UL (ref 0–0.01)
NRBC BLD-RTO: 0 PER 100 WBC
PH UR STRIP: 6 [PH] (ref 5–8)
PLATELET # BLD AUTO: 191 K/UL (ref 150–400)
PMV BLD AUTO: 9.8 FL (ref 8.9–12.9)
POTASSIUM SERPL-SCNC: 4.9 MMOL/L (ref 3.5–5.1)
PROT SERPL-MCNC: 7 G/DL (ref 6.4–8.2)
PROT UR STRIP-MCNC: NEGATIVE MG/DL
PROTHROMBIN TIME: 10 SEC (ref 9–11.1)
RBC # BLD AUTO: 4.05 M/UL (ref 3.8–5.2)
RBC #/AREA URNS HPF: NORMAL /HPF (ref 0–5)
SODIUM SERPL-SCNC: 140 MMOL/L (ref 136–145)
SP GR UR REFRACTOMETRY: 1 (ref 1–1.03)
SPECIMEN EXP DATE BLD: NORMAL
THERAPEUTIC RANGE,PTTT: NORMAL SECS (ref 58–77)
UA: UC IF INDICATED,UAUC: NORMAL
UROBILINOGEN UR QL STRIP.AUTO: 0.2 EU/DL (ref 0.2–1)
WBC # BLD AUTO: 4.6 K/UL (ref 3.6–11)
WBC URNS QL MICRO: NORMAL /HPF (ref 0–4)

## 2019-05-30 PROCEDURE — 85027 COMPLETE CBC AUTOMATED: CPT

## 2019-05-30 PROCEDURE — 80053 COMPREHEN METABOLIC PANEL: CPT

## 2019-05-30 PROCEDURE — 85730 THROMBOPLASTIN TIME PARTIAL: CPT

## 2019-05-30 PROCEDURE — 86900 BLOOD TYPING SEROLOGIC ABO: CPT

## 2019-05-30 PROCEDURE — 81001 URINALYSIS AUTO W/SCOPE: CPT

## 2019-05-30 PROCEDURE — 71046 X-RAY EXAM CHEST 2 VIEWS: CPT

## 2019-05-30 PROCEDURE — 85610 PROTHROMBIN TIME: CPT

## 2019-05-30 PROCEDURE — 36415 COLL VENOUS BLD VENIPUNCTURE: CPT

## 2019-05-30 RX ORDER — LISINOPRIL 10 MG/1
10 TABLET ORAL DAILY
COMMUNITY

## 2019-05-30 RX ORDER — CEFAZOLIN SODIUM/WATER 2 G/20 ML
2 SYRINGE (ML) INTRAVENOUS ONCE
Status: CANCELLED | OUTPATIENT
Start: 2019-06-07 | End: 2019-06-07

## 2019-05-30 RX ORDER — SODIUM CHLORIDE, SODIUM LACTATE, POTASSIUM CHLORIDE, CALCIUM CHLORIDE 600; 310; 30; 20 MG/100ML; MG/100ML; MG/100ML; MG/100ML
25 INJECTION, SOLUTION INTRAVENOUS CONTINUOUS
Status: CANCELLED | OUTPATIENT
Start: 2019-06-07

## 2019-05-30 NOTE — PERIOP NOTES
Incentive Spirometer        Using the incentive spirometer helps expand the small air sacs of your lungs, helps you breathe deeply, and helps improve your lung function. Use your incentive spirometer twice a day (10 breaths each time) prior to surgery. How to Use Your Incentive Spirometer:  1. Hold the incentive spirometer in an upright position. 2. Breathe out as usual.   3. Place the mouthpiece in your mouth and seal your lips tightly around it. 4. Take a deep breath. Breathe in slowly and as deeply as possible. Keep the blue flow rate guide between the arrows. 5. Hold your breath as long as possible. Then exhale slowly and allow the piston to fall to the bottom of the column. 6. Rest for a few seconds and repeat steps one through five at least 10 times. PAT Tidal Volume____1750______  x____2________  Date___5/30/19_____________    Dorette Oh THE INCENTIVE SPIROMETER WITH YOU TO THE HOSPITAL ON THE DAY OF YOUR SURGERY. Opportunity given to ask and answer questions as well as to observe return demonstration.     Patient signature_____________________________    Witness_______________________

## 2019-05-30 NOTE — PERIOP NOTES
[facility]  Preoperative Instructions        Surgery Date: Friday 6/7/19          Time of Arrival: 7:30 a.m. - Contact #:  [phone]    1. On the day of your surgery, please report to the Surgical Services Registration Desk and sign in at your designated time. The Surgery Center is located to the right of the Emergency Room. 2. You must have someone with you to drive you home. You should not drive a car for 24 hours following surgery. Please make arrangements for a friend or family member to stay with you for the first 24 hours after your surgery. 3. Do not have anything to eat or drink (including water, gum, mints, coffee, juice) after midnight 6/6/19   . ? This may not apply to medications prescribed by your physician. ?(Please note below the special instructions with medications to take the morning of your procedure.)    4. We recommend you do not drink any alcoholic beverages for 24 hours before and after your surgery. 5. Contact your surgeons office for instructions on the following medications: non-steroidal anti-inflammatory drugs (i.e. Advil, Aleve), vitamins, and supplements. (Some surgeons will want you to stop these medications prior to surgery and others may allow you to take them)  **If you are currently taking Plavix, Coumadin, Aspirin and/or other blood-thinning agents, contact your surgeon for instructions. ** Your surgeon will partner with the physician prescribing these medications to determine if it is safe to stop or if you need to continue taking. Please do not stop taking these medications without instructions from your surgeon    6. Wear comfortable clothes. Wear glasses instead of contacts. Do not bring any money or jewelry. Please bring picture ID, insurance card, and any prearranged co-payment or hospital payment. Do not wear make-up, particularly mascara the morning of your surgery.   Do not wear nail polish, particularly if you are having foot /hand surgery. Wear your hair loose or down, no ponytails, buns, rehana pins or clips. All body piercings must be removed. Please shower with antibacterial soap for three consecutive days before and on the morning of surgery, but do not apply any lotions, powders or deodorants after the shower on the day of surgery. Please use a fresh towels after each shower. Please sleep in clean clothes and change bed linens the night before surgery. Please do not shave for 48 hours prior to surgery. Shaving of the face is acceptable. 7. You should understand that if you do not follow these instructions your surgery may be cancelled. If your physical condition changes (I.e. fever, cold or flu) please contact your surgeon as soon as possible. 8. It is important that you be on time. If a situation occurs where you may be late, please call [phone] (OR Holding Area). 9. If you have any questions and or problems, please call [phone] (Pre-admission Testing). 10. Your surgery time may be subject to change. You will receive a phone call the evening prior if your time changes. 11.  If having outpatient surgery, you must have someone to drive you here, stay with you during the duration of your stay, and to drive you home at time of discharge. 12.   In an effort to improve the efficiency, privacy, and safety for all of our Pre-op patients visitors are not allowed in the Holding area. Once you arrive and are registered your family/visitors will be asked to remain in the waiting room. The Pre-op staff will get you from the Surgical Waiting Area and will explain to you and your family/visitors that the Pre-op phase is beginning. The staff will answer any questions and provide instructions for tracking of the patient, by use of the existing tracking number and color-coded status board in the waiting room.   At this time the staff will also ask for your designated spokesperson information in the event that the physician or staff need to provide an update or obtain any pertinent information. The designated spokesperson will be notified if the physician needs to speak to family during the pre-operative phase. If at any time your family/visitors has questions or concerns they may approach the volunteer desk in the waiting area for assistance. Special Instructions: Practice the incentive spirometer 20x per day and bring with you to the hospital on the day of surgery. MEDICATIONS TO TAKE THE MORNING OF SURGERY WITH A SIP OF WATER:  Isosorbide, aspirin - okay per Dr. Marychuy Heath. I understand a pre-operative phone call will be made to verify my surgery time. In the event that I am not available, I give permission for a message to be left on my answering service and/or with another person?    Yes         ___________________      __________   _________    (Signature of Patient)             (Witness)                (Date and Time)

## 2019-05-30 NOTE — PERIOP NOTES
Pacemaker form faxed to Soraya/Dr. Wayne Simons at [address] [facility]. Fax confirmed.

## 2019-05-31 NOTE — PERIOP NOTES
Phone call to [facility] at Dr. Mely West office regarding multiple tiny scabs to patient's arms and legs bilaterally from mosquito bites. Areas are non-inflamed. The patient states she likes to sit outside in the evenings. Patient instructed to keep covered and notify Dr. Hal Skinner if not improved. Elvira to notify Dr. Hal Skinner.

## 2019-06-07 ENCOUNTER — ANESTHESIA (OUTPATIENT)
Dept: SURGERY | Age: 59
DRG: 039 | End: 2019-06-07
Payer: COMMERCIAL

## 2019-06-07 ENCOUNTER — HOSPITAL ENCOUNTER (INPATIENT)
Age: 59
LOS: 1 days | Discharge: HOME | DRG: 039 | End: 2019-06-08
Attending: SURGERY | Admitting: SURGERY
Payer: COMMERCIAL

## 2019-06-07 ENCOUNTER — ANESTHESIA EVENT (OUTPATIENT)
Dept: SURGERY | Age: 59
DRG: 039 | End: 2019-06-07
Payer: COMMERCIAL

## 2019-06-07 DIAGNOSIS — I65.22 CAROTID STENOSIS, ASYMPTOMATIC, LEFT: Primary | ICD-10-CM

## 2019-06-07 PROCEDURE — 03UJ0JZ SUPPLEMENT LEFT COMMON CAROTID ARTERY WITH SYNTHETIC SUBSTITUTE, OPEN APPROACH: ICD-10-PCS | Performed by: SURGERY

## 2019-06-07 PROCEDURE — 74011000272 HC RX REV CODE- 272: Performed by: SURGERY

## 2019-06-07 PROCEDURE — 77030026438 HC STYL ET INTUB CARD -A: Performed by: ANESTHESIOLOGY

## 2019-06-07 PROCEDURE — 77030031139 HC SUT VCRL2 J&J -A: Performed by: SURGERY

## 2019-06-07 PROCEDURE — 77030019908 HC STETH ESOPH SIMS -A: Performed by: ANESTHESIOLOGY

## 2019-06-07 PROCEDURE — 74011250636 HC RX REV CODE- 250/636: Performed by: ANESTHESIOLOGY

## 2019-06-07 PROCEDURE — 74011250636 HC RX REV CODE- 250/636

## 2019-06-07 PROCEDURE — 88311 DECALCIFY TISSUE: CPT

## 2019-06-07 PROCEDURE — 77030039266 HC ADH SKN EXOFIN S2SG -A: Performed by: SURGERY

## 2019-06-07 PROCEDURE — 77030002987 HC SUT PROL J&J -B: Performed by: SURGERY

## 2019-06-07 PROCEDURE — 77030018824 HC SHNT CAR ARGY COVD -B: Performed by: SURGERY

## 2019-06-07 PROCEDURE — 74011250637 HC RX REV CODE- 250/637: Performed by: SURGERY

## 2019-06-07 PROCEDURE — 77030014008 HC SPNG HEMSTAT J&J -C: Performed by: SURGERY

## 2019-06-07 PROCEDURE — 77030011640 HC PAD GRND REM COVD -A: Performed by: SURGERY

## 2019-06-07 PROCEDURE — 77030005401 HC CATH RAD ARRO -A: Performed by: ANESTHESIOLOGY

## 2019-06-07 PROCEDURE — 88304 TISSUE EXAM BY PATHOLOGIST: CPT

## 2019-06-07 PROCEDURE — 76210000001 HC OR PH I REC 2.5 TO 3 HR: Performed by: SURGERY

## 2019-06-07 PROCEDURE — 77030006689 HC BLD OPHTH BVR BD -A: Performed by: SURGERY

## 2019-06-07 PROCEDURE — 77030031753 HC SHR ENDO COAG HARM J&J -E: Performed by: SURGERY

## 2019-06-07 PROCEDURE — 76060000036 HC ANESTHESIA 2.5 TO 3 HR: Performed by: SURGERY

## 2019-06-07 PROCEDURE — 03CJ0ZZ EXTIRPATION OF MATTER FROM LEFT COMMON CAROTID ARTERY, OPEN APPROACH: ICD-10-PCS | Performed by: SURGERY

## 2019-06-07 PROCEDURE — 65660000000 HC RM CCU STEPDOWN

## 2019-06-07 PROCEDURE — 77030020256 HC SOL INJ NACL 0.9%  500ML: Performed by: SURGERY

## 2019-06-07 PROCEDURE — 77030013797 HC KT TRNSDUC PRSSR EDWD -A: Performed by: ANESTHESIOLOGY

## 2019-06-07 PROCEDURE — 74011250636 HC RX REV CODE- 250/636: Performed by: SURGERY

## 2019-06-07 PROCEDURE — 77030013567 HC DRN WND RESERV BARD -A: Performed by: SURGERY

## 2019-06-07 PROCEDURE — 77030008684 HC TU ET CUF COVD -B: Performed by: ANESTHESIOLOGY

## 2019-06-07 PROCEDURE — 03CN0ZZ EXTIRPATION OF MATTER FROM LEFT EXTERNAL CAROTID ARTERY, OPEN APPROACH: ICD-10-PCS | Performed by: SURGERY

## 2019-06-07 PROCEDURE — 74011000250 HC RX REV CODE- 250

## 2019-06-07 PROCEDURE — 77030002924 HC SUT GORTX WLGO -B: Performed by: SURGERY

## 2019-06-07 PROCEDURE — C1768 GRAFT, VASCULAR: HCPCS | Performed by: SURGERY

## 2019-06-07 PROCEDURE — 76010000172 HC OR TIME 2.5 TO 3 HR INTENSV-TIER 1: Performed by: SURGERY

## 2019-06-07 PROCEDURE — 74011000250 HC RX REV CODE- 250: Performed by: SURGERY

## 2019-06-07 PROCEDURE — 77030002933 HC SUT MCRYL J&J -A: Performed by: SURGERY

## 2019-06-07 PROCEDURE — 77030012406 HC DRN WND PENRS BARD -A: Performed by: SURGERY

## 2019-06-07 DEVICE — THIN WALL CAROTID PATCH GELATIN IMPREGNATED THIN WALL KNITTED CAROTID PATCH TAPERED PATCH
Type: IMPLANTABLE DEVICE | Site: CAROTID | Status: FUNCTIONAL
Brand: THINWALL

## 2019-06-07 RX ORDER — FENTANYL CITRATE 50 UG/ML
INJECTION, SOLUTION INTRAMUSCULAR; INTRAVENOUS AS NEEDED
Status: DISCONTINUED | OUTPATIENT
Start: 2019-06-07 | End: 2019-06-07 | Stop reason: HOSPADM

## 2019-06-07 RX ORDER — ISOSORBIDE MONONITRATE 30 MG/1
15 TABLET, EXTENDED RELEASE ORAL DAILY
Status: DISCONTINUED | OUTPATIENT
Start: 2019-06-08 | End: 2019-06-08 | Stop reason: HOSPADM

## 2019-06-07 RX ORDER — PHENYLEPHRINE HCL IN 0.9% NACL 30MG/250ML
10-100 PLASTIC BAG, INJECTION (ML) INTRAVENOUS
Status: DISCONTINUED | OUTPATIENT
Start: 2019-06-07 | End: 2019-06-08 | Stop reason: HOSPADM

## 2019-06-07 RX ORDER — MORPHINE SULFATE 2 MG/ML
2 INJECTION, SOLUTION INTRAMUSCULAR; INTRAVENOUS
Status: DISCONTINUED | OUTPATIENT
Start: 2019-06-07 | End: 2019-06-08 | Stop reason: HOSPADM

## 2019-06-07 RX ORDER — HYDROMORPHONE HYDROCHLORIDE 2 MG/ML
INJECTION, SOLUTION INTRAMUSCULAR; INTRAVENOUS; SUBCUTANEOUS AS NEEDED
Status: DISCONTINUED | OUTPATIENT
Start: 2019-06-07 | End: 2019-06-07 | Stop reason: HOSPADM

## 2019-06-07 RX ORDER — FENTANYL CITRATE 50 UG/ML
25 INJECTION, SOLUTION INTRAMUSCULAR; INTRAVENOUS
Status: DISCONTINUED | OUTPATIENT
Start: 2019-06-07 | End: 2019-06-07 | Stop reason: HOSPADM

## 2019-06-07 RX ORDER — LISINOPRIL 5 MG/1
10 TABLET ORAL DAILY
Status: DISCONTINUED | OUTPATIENT
Start: 2019-06-08 | End: 2019-06-08 | Stop reason: HOSPADM

## 2019-06-07 RX ORDER — OXYCODONE AND ACETAMINOPHEN 5; 325 MG/1; MG/1
1 TABLET ORAL
Status: DISCONTINUED | OUTPATIENT
Start: 2019-06-07 | End: 2019-06-08 | Stop reason: HOSPADM

## 2019-06-07 RX ORDER — FACIAL-BODY WIPES
10 EACH TOPICAL DAILY PRN
Status: DISCONTINUED | OUTPATIENT
Start: 2019-06-07 | End: 2019-06-08 | Stop reason: HOSPADM

## 2019-06-07 RX ORDER — SODIUM CHLORIDE, SODIUM LACTATE, POTASSIUM CHLORIDE, CALCIUM CHLORIDE 600; 310; 30; 20 MG/100ML; MG/100ML; MG/100ML; MG/100ML
25 INJECTION, SOLUTION INTRAVENOUS CONTINUOUS
Status: DISCONTINUED | OUTPATIENT
Start: 2019-06-07 | End: 2019-06-07 | Stop reason: HOSPADM

## 2019-06-07 RX ORDER — ADHESIVE BANDAGE
30 BANDAGE TOPICAL DAILY PRN
Status: DISCONTINUED | OUTPATIENT
Start: 2019-06-07 | End: 2019-06-08 | Stop reason: HOSPADM

## 2019-06-07 RX ORDER — ATORVASTATIN CALCIUM 40 MG/1
80 TABLET, FILM COATED ORAL
Status: DISCONTINUED | OUTPATIENT
Start: 2019-06-07 | End: 2019-06-08 | Stop reason: HOSPADM

## 2019-06-07 RX ORDER — ONDANSETRON 2 MG/ML
4 INJECTION INTRAMUSCULAR; INTRAVENOUS AS NEEDED
Status: DISCONTINUED | OUTPATIENT
Start: 2019-06-07 | End: 2019-06-07 | Stop reason: HOSPADM

## 2019-06-07 RX ORDER — SUCCINYLCHOLINE CHLORIDE 20 MG/ML
INJECTION INTRAMUSCULAR; INTRAVENOUS AS NEEDED
Status: DISCONTINUED | OUTPATIENT
Start: 2019-06-07 | End: 2019-06-07 | Stop reason: HOSPADM

## 2019-06-07 RX ORDER — ROCURONIUM BROMIDE 10 MG/ML
INJECTION, SOLUTION INTRAVENOUS AS NEEDED
Status: DISCONTINUED | OUTPATIENT
Start: 2019-06-07 | End: 2019-06-07 | Stop reason: HOSPADM

## 2019-06-07 RX ORDER — ONDANSETRON 2 MG/ML
4 INJECTION INTRAMUSCULAR; INTRAVENOUS
Status: DISCONTINUED | OUTPATIENT
Start: 2019-06-07 | End: 2019-06-08 | Stop reason: HOSPADM

## 2019-06-07 RX ORDER — LANOLIN ALCOHOL/MO/W.PET/CERES
1.5 CREAM (GRAM) TOPICAL
Status: DISCONTINUED | OUTPATIENT
Start: 2019-06-07 | End: 2019-06-08 | Stop reason: HOSPADM

## 2019-06-07 RX ORDER — CISATRACURIUM BESYLATE 2 MG/ML
INJECTION, SOLUTION INTRAVENOUS AS NEEDED
Status: DISCONTINUED | OUTPATIENT
Start: 2019-06-07 | End: 2019-06-07 | Stop reason: HOSPADM

## 2019-06-07 RX ORDER — SODIUM CHLORIDE, SODIUM LACTATE, POTASSIUM CHLORIDE, CALCIUM CHLORIDE 600; 310; 30; 20 MG/100ML; MG/100ML; MG/100ML; MG/100ML
INJECTION, SOLUTION INTRAVENOUS
Status: DISCONTINUED | OUTPATIENT
Start: 2019-06-07 | End: 2019-06-07 | Stop reason: HOSPADM

## 2019-06-07 RX ORDER — LIDOCAINE HYDROCHLORIDE 10 MG/ML
0.1 INJECTION, SOLUTION EPIDURAL; INFILTRATION; INTRACAUDAL; PERINEURAL AS NEEDED
Status: DISCONTINUED | OUTPATIENT
Start: 2019-06-07 | End: 2019-06-07

## 2019-06-07 RX ORDER — MIDAZOLAM HYDROCHLORIDE 1 MG/ML
1 INJECTION, SOLUTION INTRAMUSCULAR; INTRAVENOUS AS NEEDED
Status: DISCONTINUED | OUTPATIENT
Start: 2019-06-07 | End: 2019-06-07

## 2019-06-07 RX ORDER — OXYCODONE AND ACETAMINOPHEN 5; 325 MG/1; MG/1
2 TABLET ORAL
Status: DISCONTINUED | OUTPATIENT
Start: 2019-06-07 | End: 2019-06-08 | Stop reason: HOSPADM

## 2019-06-07 RX ORDER — PHENYLEPHRINE HYDROCHLORIDE 10 MG/ML
INJECTION INTRAVENOUS
Status: DISPENSED
Start: 2019-06-07 | End: 2019-06-08

## 2019-06-07 RX ORDER — SODIUM CHLORIDE, SODIUM LACTATE, POTASSIUM CHLORIDE, CALCIUM CHLORIDE 600; 310; 30; 20 MG/100ML; MG/100ML; MG/100ML; MG/100ML
25 INJECTION, SOLUTION INTRAVENOUS CONTINUOUS
Status: DISCONTINUED | OUTPATIENT
Start: 2019-06-07 | End: 2019-06-07

## 2019-06-07 RX ORDER — ASPIRIN 325 MG
325 TABLET ORAL DAILY
Status: DISCONTINUED | OUTPATIENT
Start: 2019-06-08 | End: 2019-06-08 | Stop reason: HOSPADM

## 2019-06-07 RX ORDER — MORPHINE SULFATE 2 MG/ML
4 INJECTION, SOLUTION INTRAMUSCULAR; INTRAVENOUS
Status: DISCONTINUED | OUTPATIENT
Start: 2019-06-07 | End: 2019-06-08 | Stop reason: HOSPADM

## 2019-06-07 RX ORDER — FENTANYL CITRATE 50 UG/ML
50 INJECTION, SOLUTION INTRAMUSCULAR; INTRAVENOUS AS NEEDED
Status: DISCONTINUED | OUTPATIENT
Start: 2019-06-07 | End: 2019-06-07

## 2019-06-07 RX ORDER — ETOMIDATE 2 MG/ML
INJECTION INTRAVENOUS AS NEEDED
Status: DISCONTINUED | OUTPATIENT
Start: 2019-06-07 | End: 2019-06-07 | Stop reason: HOSPADM

## 2019-06-07 RX ORDER — LIDOCAINE HYDROCHLORIDE 20 MG/ML
INJECTION, SOLUTION EPIDURAL; INFILTRATION; INTRACAUDAL; PERINEURAL AS NEEDED
Status: DISCONTINUED | OUTPATIENT
Start: 2019-06-07 | End: 2019-06-07 | Stop reason: HOSPADM

## 2019-06-07 RX ORDER — MORPHINE SULFATE 10 MG/ML
2 INJECTION, SOLUTION INTRAMUSCULAR; INTRAVENOUS
Status: DISCONTINUED | OUTPATIENT
Start: 2019-06-07 | End: 2019-06-07 | Stop reason: HOSPADM

## 2019-06-07 RX ORDER — MELATONIN
1000 DAILY
Status: DISCONTINUED | OUTPATIENT
Start: 2019-06-08 | End: 2019-06-08 | Stop reason: HOSPADM

## 2019-06-07 RX ORDER — ONDANSETRON 2 MG/ML
INJECTION INTRAMUSCULAR; INTRAVENOUS AS NEEDED
Status: DISCONTINUED | OUTPATIENT
Start: 2019-06-07 | End: 2019-06-07 | Stop reason: HOSPADM

## 2019-06-07 RX ORDER — SODIUM CHLORIDE 9 MG/ML
50 INJECTION, SOLUTION INTRAVENOUS CONTINUOUS
Status: DISCONTINUED | OUTPATIENT
Start: 2019-06-07 | End: 2019-06-08 | Stop reason: HOSPADM

## 2019-06-07 RX ORDER — PROTAMINE SULFATE 10 MG/ML
INJECTION, SOLUTION INTRAVENOUS AS NEEDED
Status: DISCONTINUED | OUTPATIENT
Start: 2019-06-07 | End: 2019-06-07 | Stop reason: HOSPADM

## 2019-06-07 RX ORDER — CEFAZOLIN SODIUM/WATER 2 G/20 ML
2 SYRINGE (ML) INTRAVENOUS
Status: COMPLETED | OUTPATIENT
Start: 2019-06-07 | End: 2019-06-07

## 2019-06-07 RX ORDER — METOPROLOL SUCCINATE 50 MG/1
50 TABLET, EXTENDED RELEASE ORAL
Status: DISCONTINUED | OUTPATIENT
Start: 2019-06-07 | End: 2019-06-08 | Stop reason: HOSPADM

## 2019-06-07 RX ADMIN — CISATRACURIUM BESYLATE 4 MG: 2 INJECTION, SOLUTION INTRAVENOUS at 11:04

## 2019-06-07 RX ADMIN — ONDANSETRON 4 MG: 2 INJECTION INTRAMUSCULAR; INTRAVENOUS at 14:11

## 2019-06-07 RX ADMIN — FENTANYL CITRATE 50 MCG: 50 INJECTION, SOLUTION INTRAMUSCULAR; INTRAVENOUS at 10:50

## 2019-06-07 RX ADMIN — PROTAMINE SULFATE 40 MG: 10 INJECTION, SOLUTION INTRAVENOUS at 13:47

## 2019-06-07 RX ADMIN — ATORVASTATIN CALCIUM 80 MG: 40 TABLET, FILM COATED ORAL at 19:56

## 2019-06-07 RX ADMIN — ROCURONIUM BROMIDE 5 MG: 10 INJECTION, SOLUTION INTRAVENOUS at 10:50

## 2019-06-07 RX ADMIN — SODIUM CHLORIDE, SODIUM LACTATE, POTASSIUM CHLORIDE, AND CALCIUM CHLORIDE 25 ML/HR: 600; 310; 30; 20 INJECTION, SOLUTION INTRAVENOUS at 10:02

## 2019-06-07 RX ADMIN — LIDOCAINE HYDROCHLORIDE 40 MG: 20 INJECTION, SOLUTION EPIDURAL; INFILTRATION; INTRACAUDAL; PERINEURAL at 10:50

## 2019-06-07 RX ADMIN — Medication 2 G: at 11:02

## 2019-06-07 RX ADMIN — SUCCINYLCHOLINE CHLORIDE 160 MG: 20 INJECTION INTRAMUSCULAR; INTRAVENOUS at 10:50

## 2019-06-07 RX ADMIN — HYDROMORPHONE HYDROCHLORIDE 0.8 MG: 2 INJECTION, SOLUTION INTRAMUSCULAR; INTRAVENOUS; SUBCUTANEOUS at 10:27

## 2019-06-07 RX ADMIN — Medication 5 MCG/MIN: at 14:00

## 2019-06-07 RX ADMIN — ETOMIDATE 18 MG: 2 INJECTION INTRAVENOUS at 10:50

## 2019-06-07 RX ADMIN — SODIUM CHLORIDE, SODIUM LACTATE, POTASSIUM CHLORIDE, CALCIUM CHLORIDE: 600; 310; 30; 20 INJECTION, SOLUTION INTRAVENOUS at 10:40

## 2019-06-07 RX ADMIN — FENTANYL CITRATE 50 MCG: 50 INJECTION, SOLUTION INTRAMUSCULAR; INTRAVENOUS at 13:30

## 2019-06-07 RX ADMIN — MORPHINE SULFATE 2 MG: 2 INJECTION, SOLUTION INTRAMUSCULAR; INTRAVENOUS at 19:59

## 2019-06-07 RX ADMIN — ONDANSETRON 4 MG: 2 INJECTION INTRAMUSCULAR; INTRAVENOUS at 13:04

## 2019-06-07 RX ADMIN — OXYCODONE AND ACETAMINOPHEN 1 TABLET: 5; 325 TABLET ORAL at 22:53

## 2019-06-07 RX ADMIN — ONDANSETRON 4 MG: 2 INJECTION INTRAMUSCULAR; INTRAVENOUS at 20:05

## 2019-06-07 RX ADMIN — METOPROLOL SUCCINATE 50 MG: 50 TABLET, EXTENDED RELEASE ORAL at 19:57

## 2019-06-07 RX ADMIN — SODIUM CHLORIDE 50 ML/HR: 900 INJECTION, SOLUTION INTRAVENOUS at 14:03

## 2019-06-07 RX ADMIN — MELATONIN 1.5 MG: at 19:57

## 2019-06-07 RX ADMIN — MIDAZOLAM HYDROCHLORIDE 2 MG: 1 INJECTION, SOLUTION INTRAMUSCULAR; INTRAVENOUS at 10:27

## 2019-06-07 NOTE — BRIEF OP NOTE
BRIEF OPERATIVE NOTE    Date of Procedure: 6/7/2019   Preoperative Diagnosis: LEFT CAROTID STENOSIS  Postoperative Diagnosis: LEFT CAROTID STENOSIS    Procedure(s):  LEFT CAROTID ARTERY ENDARTERECTOMY  Surgeon(s) and Role:     * Nuvia He MD - Primary         Surgical Assistant: None    Surgical Staff:  Circ-1: Carlin Pritchett RN  Circ-Relief: Magiu Edwards RN  Scrub Tech-1: Jay Wilcox  Scrub Tech-Relief: Mehnaz CHANEY  Surg Asst-1: Alexander Mcintyre  Surg Asst-Relief: Gunnar Duke  Event Time In Time Out   Incision Start 1115    Incision Close       Anesthesia: General   Estimated Blood Loss: 100 cc  Specimens:   ID Type Source Tests Collected by Time Destination   1 : LEFT CAROTID PLAQUE Preservative Neck  Nuvia He MD 6/7/2019 1257 Pathology      Findings: Severe stenosis. Complications: None  Implants:   Implant Name Type Inv.  Item Serial No.  Lot No. LRB No. Used Action   GRAFT PTCH TW GEL SEAL [id number] -- Abhishek Border  GRAFT PTCH TW GEL SEAL [id number] -- Russell Brizuela [id number] TERUNM [facility] CARDIOVASCULAR [id number] Left 1 Implanted

## 2019-06-07 NOTE — ANESTHESIA PREPROCEDURE EVALUATION
Anesthetic History   No history of anesthetic complications            Review of Systems / Medical History  Patient summary reviewed, nursing notes reviewed and pertinent labs reviewed    Pulmonary          Smoker      Comments: Former smoker   Neuro/Psych         Psychiatric history    Comments: Anxiety/depression Cardiovascular    Hypertension: well controlled      CHF  Dysrhythmias   Pacemaker, CAD, PAD, CABG (2010) and hyperlipidemia    Exercise tolerance: >4 METS  Comments: Minimal exercise    ECHO 2018 EF=60%     GI/Hepatic/Renal     GERD: well controlled          Comments: Anand's Esophagus Endo/Other      Hypothyroidism  Morbid obesity and arthritis     Other Findings   Comments: Rt thyroid nodule stable  Sciatica   Peripheral neuropathy          Physical Exam    Airway  Mallampati: III  TM Distance: < 4 cm  Neck ROM: normal range of motion   Mouth opening: Normal     Cardiovascular    Rhythm: regular  Rate: normal         Dental  No notable dental hx       Pulmonary  Breath sounds clear to auscultation               Abdominal  GI exam deferred       Other Findings            Anesthetic Plan    ASA: 3  Anesthesia type: general    Monitoring Plan: Arterial line and BIS      Induction: Intravenous  Anesthetic plan and risks discussed with: Patient

## 2019-06-07 NOTE — ANESTHESIA POSTPROCEDURE EVALUATION
Procedure(s):  LEFT CAROTID ARTERY ENDARTERECTOMY. general    Anesthesia Post Evaluation        Patient location during evaluation: PACU  Note status: Adequate. Level of consciousness: responsive to verbal stimuli and sleepy but conscious  Pain management: satisfactory to patient  Airway patency: patent  Anesthetic complications: no  Cardiovascular status: acceptable  Respiratory status: acceptable  Hydration status: acceptable  Comments: +Post-Anesthesia Evaluation and Assessment    Patient: Joseline Rousseau MRN: [id number]  SSN: [id number]   YOB: 1960  Age: 62 y.o. Sex: female      Cardiovascular Function/Vital Signs    BP 99/50   Pulse 62   Temp 36.3 °C (97.4 °F)   Resp 13   Ht 4' 11\" (1.499 m)   Wt 85.9 kg (189 lb 6 oz)   SpO2 95%   BMI 38.25 kg/m²     Patient is status post Procedure(s):  LEFT CAROTID ARTERY ENDARTERECTOMY. Nausea/Vomiting: Controlled. Postoperative hydration reviewed and adequate. Pain:  Pain Scale 1: Numeric (0 - 10) (06/07/19 1430)  Pain Intensity 1: 0 (06/07/19 1430)   Managed. Neurological Status:   Neuro (WDL): Within Defined Limits (06/07/19 1330)   At baseline. Mental Status and Level of Consciousness: Arousable. Pulmonary Status:   O2 Device: Nasal cannula (06/07/19 1430)   Adequate oxygenation and airway patent. Complications related to anesthesia: None    Post-anesthesia assessment completed. No concerns. Signed By: Shane Walters MD    6/7/2019  Post anesthesia nausea and vomiting:  controlled      Vitals Value Taken Time   BP 99/50 6/7/2019  2:30 PM   Temp 36.3 °C (97.4 °F) 6/7/2019  2:30 PM   Pulse 61 6/7/2019  2:38 PM   Resp 10 6/7/2019  2:38 PM   SpO2 96 % 6/7/2019  2:38 PM   Vitals shown include unvalidated device data.

## 2019-06-07 NOTE — PROGRESS NOTES
TRANSFER - IN REPORT:    Verbal report received from Cristian Paul RN (name) on Carla Zaldivar  being received from PACU (unit) for routine post - op      Report consisted of patients Situation, Background, Assessment and   Recommendations(SBAR). Information from the following report(s) SBAR, Kardex, ED Summary, OR Summary, Procedure Summary, Intake/Output, MAR, Recent Results, Med Rec Status, Cardiac Rhythm Paced and Procedure Verification was reviewed with the receiving nurse. Opportunity for questions and clarification was provided. Assessment completed upon patients arrival to unit and care assumed. Primary Nurse Rosibel Vernon RN and Hailey Kong RN performed a dual skin assessment on this patient Impairment noted- see wound doc flow sheet  Dylan score is 20    1920--Bedside and Verbal shift change report given to Sarah Huang RN (oncoming nurse) by Daniel Escobar RN (offgoing nurse). Report included the following information SBAR, Kardex, ED Summary, OR Summary, Procedure Summary, Intake/Output, MAR, Recent Results, Med Rec Status, Cardiac Rhythm NSR, Alarm Parameters  and Quality Measures.

## 2019-06-07 NOTE — PERIOP NOTES
Handoff Report from Operating Room to PACU    Report received from Stuart Mccurdy CRNA regarding Dimas Youssef. Surgeon(s):  Galilea Chisholm MD  And Procedure(s) (LRB):  LEFT CAROTID ARTERY ENDARTERECTOMY (Left)  confirmed   with drains and dressings discussed. Anesthesia type, drugs, patient history, complications, estimated blood loss, vital signs, intake and output, and last pain medication, lines and reversal medications were reviewed.

## 2019-06-07 NOTE — PERIOP NOTES
Left neck wound dressing checked by Dr. Shirley Rodriguez, no active bleeding or hematoma noted, 4x4 with medipore tape changed per Dr. Shirley Rodriguez

## 2019-06-07 NOTE — PROGRESS NOTES
Orders received and chart reviewed. PT orders scheduled to start tomorrow, 6/8/19. Will follow-up with pt for PT evaluation.     Sharlene Parra, SPT

## 2019-06-07 NOTE — PERIOP NOTES
LUIS from left neck wound was oozy, emptied 50 ml sanguinous  X 2with some clots, then another 30 ml, Dr. Josselin Russo was notified, Aaron Bowen CRNA came in to give protamine 40mg iv, bleeding has slowed down then stopped, milked luis line, clots removed, new LUIS bulb placed, LUIS patent and draing, emptied 10 ml, Dr Josselin Russo was aware, will admit pt to PCU

## 2019-06-07 NOTE — PERIOP NOTES
TRANSFER - OUT REPORT:    Verbal report given to Mario Uribe RN (name) on Khadra Cheatham  being transferred to [facility](unit) for routine post - op       Report consisted of patients Situation, Background, Assessment and   Recommendations(SBAR). Information from the following report(s) SBAR, Kardex, OR Summary, Intake/Output, MAR, Recent Results and Cardiac Rhythm paced was reviewed with the receiving nurse. Opportunity for questions and clarification was provided.       Patient transported with:   Monitor  O2 @ 2 liters  Registered Nurse  [facility]

## 2019-06-07 NOTE — PERIOP NOTES
1005:  All belongings given to her  in waiting room    1008:  Time out completed for A-Line placement with Samira [place], [address] [place] [address]    8919:  Dr. Lorie Gilford at bedside to discuss anesthesia plan of care    1033:  Line placement complete    1035:  Dr. Villatoro Congress in to see the patient & dionne site    1038:  Patient to OR

## 2019-06-08 VITALS
TEMPERATURE: 98.6 F | DIASTOLIC BLOOD PRESSURE: 64 MMHG | RESPIRATION RATE: 17 BRPM | HEART RATE: 60 BPM | OXYGEN SATURATION: 96 % | BODY MASS INDEX: 39.47 KG/M2 | SYSTOLIC BLOOD PRESSURE: 121 MMHG | HEIGHT: 59 IN | WEIGHT: 195.8 LBS

## 2019-06-08 LAB
ANION GAP SERPL CALC-SCNC: 4 MMOL/L (ref 5–15)
BUN SERPL-MCNC: 14 MG/DL (ref 6–20)
BUN/CREAT SERPL: 14 (ref 12–20)
CALCIUM SERPL-MCNC: 8.3 MG/DL (ref 8.5–10.1)
CHLORIDE SERPL-SCNC: 110 MMOL/L (ref 97–108)
CO2 SERPL-SCNC: 26 MMOL/L (ref 21–32)
CREAT SERPL-MCNC: 1.01 MG/DL (ref 0.55–1.02)
ERYTHROCYTE [DISTWIDTH] IN BLOOD BY AUTOMATED COUNT: 12.6 % (ref 11.5–14.5)
GLUCOSE SERPL-MCNC: 117 MG/DL (ref 65–100)
HCT VFR BLD AUTO: 30.6 % (ref 35–47)
HGB BLD-MCNC: 10.2 G/DL (ref 11.5–16)
MCH RBC QN AUTO: 30.7 PG (ref 26–34)
MCHC RBC AUTO-ENTMCNC: 33.3 G/DL (ref 30–36.5)
MCV RBC AUTO: 92.2 FL (ref 80–99)
NRBC # BLD: 0 K/UL (ref 0–0.01)
NRBC BLD-RTO: 0 PER 100 WBC
PLATELET # BLD AUTO: 172 K/UL (ref 150–400)
PMV BLD AUTO: 10.1 FL (ref 8.9–12.9)
POTASSIUM SERPL-SCNC: 4.7 MMOL/L (ref 3.5–5.1)
RBC # BLD AUTO: 3.32 M/UL (ref 3.8–5.2)
SODIUM SERPL-SCNC: 140 MMOL/L (ref 136–145)
WBC # BLD AUTO: 7.7 K/UL (ref 3.6–11)

## 2019-06-08 PROCEDURE — 80048 BASIC METABOLIC PNL TOTAL CA: CPT

## 2019-06-08 PROCEDURE — 97535 SELF CARE MNGMENT TRAINING: CPT | Performed by: OCCUPATIONAL THERAPIST

## 2019-06-08 PROCEDURE — 36415 COLL VENOUS BLD VENIPUNCTURE: CPT

## 2019-06-08 PROCEDURE — 97161 PT EVAL LOW COMPLEX 20 MIN: CPT | Performed by: PHYSICAL THERAPIST

## 2019-06-08 PROCEDURE — 77010033678 HC OXYGEN DAILY

## 2019-06-08 PROCEDURE — 74011250637 HC RX REV CODE- 250/637: Performed by: SURGERY

## 2019-06-08 PROCEDURE — 97165 OT EVAL LOW COMPLEX 30 MIN: CPT | Performed by: OCCUPATIONAL THERAPIST

## 2019-06-08 PROCEDURE — 85027 COMPLETE CBC AUTOMATED: CPT

## 2019-06-08 RX ORDER — OXYCODONE AND ACETAMINOPHEN 5; 325 MG/1; MG/1
1 TABLET ORAL
Qty: 20 TAB | Refills: 0 | Status: SHIPPED | OUTPATIENT
Start: 2019-06-08 | End: 2019-06-11

## 2019-06-08 RX ADMIN — VITAMIN D, TAB 1000IU (100/BT) 1000 UNITS: 25 TAB at 09:24

## 2019-06-08 RX ADMIN — ASPIRIN 325 MG: 325 TABLET ORAL at 09:24

## 2019-06-08 RX ADMIN — ISOSORBIDE MONONITRATE 15 MG: 30 TABLET, EXTENDED RELEASE ORAL at 09:24

## 2019-06-08 RX ADMIN — LISINOPRIL 10 MG: 5 TABLET ORAL at 09:24

## 2019-06-08 NOTE — PROGRESS NOTES
PCU SHIFT NURSING NOTE      Bedside and Verbal shift change report given to Gerda Villeda RN (oncoming nurse) by Patti Capone RN (offgoing nurse). Report included the following information SBAR, Kardex, ED Summary, OR Summary, Procedure Summary, Intake/Output, MAR, Recent Results, Med Rec Status, Cardiac Rhythm Paced, Alarm Parameters  and Quality Measures. Shift Summary:   0700--Bedside report received from Patti Capone RN.   0721--Patients vital signs assessed and charted into connect care flow sheets. MEWS score is now a 1 and patient is reporting no pain at this time. Patient is on room air sating 94%. Call bell is within reach. No other needs at this time. 0819--Patient assessment is complete and charted into connect care flow sheets. Patient has no other needs at this time. Will monitor. 1013-- Evon Oliva) is in to see patient this morning. 1020--OT Lon Boxer is in to see patient this morning. 1041--Dr. Suzanne Canavan is in to see patient at bedside. Dr. Jessica Aguero is pulling the ASDI line and will discharge patient today. Patient tolerated procedure well. 1114--Reassessment complete and charted into care flow sheets. Preparing patient to go home. Patients IV and telemetry has been taken off in preparation for discharge. Will monitor.

## 2019-06-08 NOTE — PROGRESS NOTES
Reason for Admission:  Left Carotid Stenosis                   RRAT Score:   8                  Plan for utilizing home health:  Pending PT/OT evaluations                    Current Advanced Directive/Advance Care Plan: No Code Status documented at this time, no ACP on file at this time    Likelihood of Readmission: Low                          Transition of Care Plan:  TBD by PT/OT evaluations, Follow-up Care appointments    Pt is a 63 yo  female admitted on 6/7/19 for Left Carotid Stenosis. [place] ([address]) with spouse in [facility], [facility]. PTA, pt independent in ADLs/IADLs to include driving. Reports hx of HH (does not recall agency name), no hx of SNF or acute inpatient rehab. Pt has access to a shower chair, w/c, BSC, RW, motorized scooter, and hospital bed at home. Pt's spouse to provide transportation at discharge and assist with transportation to follow-up care appointments as needed. Preferred Rx is [facility] ([address]). CM met with pt to verify demographic info and complete initial assessment, dc planning. Pt is alert and oriented x 4. Pt sees Dr. Brooks Dyson (PCP) and Dr. Jc Iyer (Vascular Surgery) outpatient. PT/OT consults pending at this time. CM to follow-up with additional discharge planning needs. Care Management Interventions  PCP Verified by CM: Yes  Palliative Care Criteria Met (RRAT>21 & CHF Dx)?: No  Mode of Transport at Discharge:  Other (see comment)(by private vehicle with spouse)  Transition of Care Consult (CM Consult): Discharge Planning  Discharge Durable Medical Equipment: No  Health Maintenance Reviewed: Yes  Physical Therapy Consult: Yes  Occupational Therapy Consult: Yes  Speech Therapy Consult: No  Current Support Network: Lives with Spouse, Own Home(one-story house ([address]) with spouse in [facility])  Confirm Follow Up Transport: Family  Plan discussed with Pt/Family/Caregiver: Yes  Discharge Location  Discharge Placement: (TBD)    Sujit Jeffers [place], MSW Supervisee in Social Work, [address] [place] [address]  [phone]

## 2019-06-08 NOTE — PROGRESS NOTES
Occupational Therapy EVALUATION/discharge  Patient: Giovanni Rousseau (63 y.o. female)  Date: 2019  Primary Diagnosis: Carotid stenosis, asymptomatic, left [I65.22]  Procedure(s) (LRB):  LEFT CAROTID ARTERY ENDARTERECTOMY (Left) 1 Day Post-Op   Precautions:        ASSESSMENT:   Based on the objective data described below, the patient presents at an overall independent level for basic self-care tasks. She demonstrates good safety awareness and feels like she is ready to go home. Her  will be there to assist if needed. Patient instructed to pace herself and take rest breaks when needed at home. She indicates understanding. Further skilled acute occupational therapy is not indicated at this time. Discharge Recommendations: None  Further Equipment Recommendations for Discharge: None      SUBJECTIVE:   Patient stated Whatever I need to do to get you to leave me alone.     OBJECTIVE DATA SUMMARY:   HISTORY:   Past Medical History:   Diagnosis Date    Arrhythmia     hx A-fib    Arthritis     CAD (coronary artery disease)     CABG     GERD (gastroesophageal reflux disease)     Heart failure (HCC)     Hypertension     Other ill-defined conditions(799.89)     epidural injections    Pacemaker 2018    dual chamber    Psychiatric disorder     anxiety/depression, years ago per patient    PVD (peripheral vascular disease) ([facility] [address]     left iliac stent    Right thyroid nodule 2014    stable     Past Surgical History:   Procedure Laterality Date    CABG, ARTERY-VEIN, THREE  2010    COLONOSCOPY,DIAGNOSTIC  3/6/2015         HX BACK SURGERY      microdiscectomy 2009 L4-L5, and in     HX GYN      D&C    HX GYN       x3    HX HEENT      septoplasty    HX HYSTEROSCOPY WITH ENDOMETRIAL ABLATION      HX IMPLANTABLE CARDIOVERTER DEFIBRILLATOR  2011    removed in 2018    HX ORTHOPAEDIC      surgery on right hand with tendon cut and repaired    HX OTHER SURGICAL angioplasty-left iliac    HX OTHER SURGICAL      EGD-Anand's Esophagus    HX TONSILLECTOMY      HX TUBAL LIGATION         Prior Level of Function/Environment/Context: Independent, including working and driving. Patient instructed by MD not to drive until follow up appointment. She is able to work from home (and plans to return to working on Monday). Expanded or extensive additional review of patient history:     Home Situation  Home Environment: Private residence  # Steps to Enter: 4  Rails to Enter: Yes  Hand Rails : Left  One/Two Story Residence: One story  Living Alone: No  Support Systems: Child(trinidad), [employer] community, Family member(s), Friends \ neighbors, Spouse/Significant Other/Partner  Patient Expects to be Discharged to[de-identified] Private residence  Current DME Used/Available at Home: None  Tub or Shower Type: Shower    Hand dominance: Right    EXAMINATION OF PERFORMANCE DEFICITS:  Cognitive/Behavioral Status:  Neurologic State: Alert; Appropriate for age  Orientation Level: Oriented X4  Cognition: Appropriate decision making; Follows commands  Perception: Appears intact  Perseveration: No perseveration noted  Safety/Judgement: Awareness of environment; Insight into deficits;Home safety; Fall prevention    Range of Motion:  AROM: Within functional limits           Strength:  Strength: Within functional limits        Coordination:     Fine Motor Skills-Upper: Left Intact; Right Intact    Gross Motor Skills-Upper: Left Intact; Right Intact    Tone & Sensation:  Tone: Normal  Sensation: Intact                      Balance:  Sitting: Intact  Standing: Intact    Functional Mobility and Transfers for ADLs:  Bed Mobility:       Transfers:  Sit to Stand: Independent  Stand to Sit: Independent  Bathroom Mobility: Independent  Toilet Transfer : Independent    ADL Assessment:  Feeding: Independent    Oral Facial Hygiene/Grooming: Independent    Bathing: Independent    Upper Body Dressing: Independent    Lower Body Dressing: Independent    Toileting: Independent                ADL Intervention and task modifications:  Discussed safety and fall prevention. Briefly discussed energy conservation, mostly related to pacing and rest breaks. Cognitive Retraining  Safety/Judgement: Awareness of environment; Insight into deficits;Home safety; Fall prevention    Functional Measure:  Barthel Index:    Bathin  Bladder: 10  Bowels: 10  Groomin  Dressing: 10  Feeding: 10  Mobility: 15  Stairs: 10  Toilet Use: 10  Transfer (Bed to Chair and Back): 15  Total: 100/100        Percentage of impairment   0%   1-19%   20-39%   40-59%   60-79%   80-99%   100%   Barthel Score 0-100 100 99-80 79-60 59-40 20-39 1-19   0     The Barthel ADL Index: Guidelines  1. The index should be used as a record of what a patient does, not as a record of what a patient could do. 2. The main aim is to establish degree of independence from any help, physical or verbal, however minor and for whatever reason. 3. The need for supervision renders the patient not independent. 4. A patient's performance should be established using the best available evidence. Asking the patient, friends/relatives and nurses are the usual sources, but direct observation and common sense are also important. However direct testing is not needed. 5. Usually the patient's performance over the preceding 24-48 hours is important, but occasionally longer periods will be relevant. 6. Middle categories imply that the patient supplies over 50 per cent of the effort. 7. Use of aids to be independent is allowed. Misty Souza., Barthel, D.W. (9503). Functional evaluation: the Barthel Index. [facility] (14)2. JUDY Dickey, Kevin Mohamud., Alec Fine., Sudhir, [facility] (). Measuring the change indisability after inpatient rehabilitation; comparison of the responsiveness of the Barthel Index and Functional [place] Measure.  Journal of Neurology, Neurosurgery, and Psychiatry, 664), 280-657. Vijaylubna Davis, JO ANN, KAYLEIGH Morales, & Alli Slater M.A. (2004.) Assessment of post-stroke quality of life in cost-effectiveness studies: The usefulness of the Barthel Index and the EuroQoL-5D. Quality of Life Research, 15, 279-13       Occupational Therapy Evaluation Charge Determination   History Examination Decision-Making   LOW Complexity : Brief history review  LOW Complexity : 1-3 performance deficits relating to physical, cognitive , or psychosocial skils that result in activity limitations and / or participation restrictions  LOW Complexity : No comorbidities that affect functional and no verbal or physical assistance needed to complete eval tasks       Based on the above components, the patient evaluation is determined to be of the following complexity level: LOW   Pain:  Pain Scale 1: Numeric (0 - 10)  Pain Intensity 1: 0             Activity Tolerance:   Good  After treatment:   [x]  Patient left in no apparent distress sitting up in chair  []  Patient left in no apparent distress in bed  [x]  Call bell left within reach  [x]  Nursing notified  [x]  Caregiver present  []  Bed alarm activated    COMMUNICATION/EDUCATION:   Communication/Collaboration:  [x]      Home safety education was provided and the patient/caregiver indicated understanding. [x]      Patient/family have participated as able and agree with findings and recommendations. []      Patient is unable to participate in plan of care at this time.   Findings and recommendations were discussed with: Physical Therapist and Registered Nurse    VALENTIN Garza/L  Time Calculation: 16 mins

## 2019-06-08 NOTE — PROGRESS NOTES
Problem: Falls - Risk of  Goal: *Absence of Falls  Description  Document Toby Terry Fall Risk and appropriate interventions in the flowsheet.   Outcome: Progressing Towards Goal     Problem: Patient Education: Go to Patient Education Activity  Goal: Patient/Family Education  Outcome: Progressing Towards Goal     Problem: General Medical Care Plan  Goal: *Vital signs within specified parameters  Outcome: Progressing Towards Goal  Goal: *Labs within defined limits  Outcome: Progressing Towards Goal  Goal: *Absence of infection signs and symptoms  Outcome: Progressing Towards Goal  Goal: *Optimal pain control at patient's stated goal  Outcome: Progressing Towards Goal  Goal: *Skin integrity maintained  Outcome: Progressing Towards Goal  Goal: *Fluid volume balance  Outcome: Progressing Towards Goal  Goal: *Optimize nutritional status  Outcome: Progressing Towards Goal  Goal: *Anxiety reduced or absent  Outcome: Progressing Towards Goal  Goal: *Progressive mobility and function (eg: ADL's)  Outcome: Progressing Towards Goal     Problem: Patient Education: Go to Patient Education Activity  Goal: Patient/Family Education  Outcome: Progressing Towards Goal

## 2019-06-08 NOTE — PROGRESS NOTES
Vascular  VSS  Neuro intact  Neck flat  Drain d/erickson by me  Instructions given to the patient  Home with showering to get wound we in 4 days  Call with any issues; I suggested no driving until seen by Angelia Dance in 10 days

## 2019-06-08 NOTE — PROGRESS NOTES
Nutrition: MST received - reports no recent weight loss and not eating poorly PTA; \"unsure\" how much weight loss triggered referral. Chart review indicates no weight loss over the past several months. Noted plans for discharge today. RD available by consult if needs arise. Thanks.   Sudha Tellez RD

## 2019-06-08 NOTE — PROGRESS NOTES
BRADLEY: Follow-up Care Appointments     Pt to discharge home today by private vehicle with spouse. No PT/OT recommendations for discharge. No qualifying dx for [facility] visit. Pt/family to call and schedule PCP and Vascular Surgery f/u appointments, as offices are closed at this time. All information entered into pt AVS.     Pt has no additional CM needs at this time. Care Management Interventions  PCP Verified by CM: Yes  Palliative Care Criteria Met (RRAT>21 & CHF Dx)?: No  Mode of Transport at Discharge:  Other (see comment)(by private vehicle with spouse)  Transition of Care Consult (CM Consult): Discharge Planning  Discharge Durable Medical Equipment: No  Health Maintenance Reviewed: Yes  Physical Therapy Consult: Yes  Occupational Therapy Consult: Yes  Speech Therapy Consult: No  Current Support Network: Lives with Spouse, Own Home(one-story house ([address]) with spouse in [facility])  Confirm Follow Up Transport: Family  Plan discussed with Pt/Family/Caregiver: Yes  Discharge Location  Discharge Placement: Home with family assistance    DELL Perdomo Supervisee in Social Work, [address] [place] [address]  [phone]

## 2019-06-08 NOTE — PROGRESS NOTES
Occupational Therapy  OT consult received, chart reviewed. Patient was seen this morning for OT evaluation. No skilled OT needs upon discharge. Full evaluation note to follow.

## 2019-06-08 NOTE — PROGRESS NOTES
1930 - Bedside report from [address] [place] [address]. Paced. Resting in bed, tired and wants to sleep. Owen drip turned off recently by day RN. Pressures are stable for pt.    2000 Morphine 2mg for 6/10 incisional pain. Zofran also per request.      2250 - Percocet 1 PO for 6/10 incisional pain. 5cc serosanguinous fluid in SADI drain. 2l NC 02 for sleeping sats 88-90.      0700 - Eating, taking adequate food and fluids. 2cc serosanguinous fluid in SADI drain. Dsg CDI. Up to restroom prn. VSS.

## 2019-06-08 NOTE — DISCHARGE INSTRUCTIONS
Patient Education        Carotid Endarterectomy: What to Expect at Home  Your Recovery  A carotid endarterectomy (say \"solange-RAW-shannan dominguezkz-mlo-yma-CHULA-no-ayala\") is surgery to remove fatty build-up (plaque) from one of the carotid arteries. There are two carotid arteries--one on each side of the neck--that supply blood to the brain. When plaque builds up in either artery, it can make it hard for blood to flow to the brain. This surgery may lower your risk of having a stroke. You may have a sore throat for a few days. You can expect the cut (incision) in your neck to be sore for about a week. The area around the incision may also be swollen and bruised at first. The area in front of the incision may be numb. This usually gets better after 6 to 12 months. Your doctor closed the incision in your neck with stitches. The stitches will be removed 7 to 10 days after surgery, or you may have stitches that dissolve on their own. You may feel more tired than usual for several weeks after surgery. You will probably be able to go back to work or your usual activities in 1 to 2 weeks. This care sheet gives you a general idea about how long it will take for you to recover. But each person recovers at a different pace. Follow the steps below to feel better as quickly as possible. How can you care for yourself at home? Activity    · Rest when you feel tired. Getting enough sleep will help you recover.     · Try to walk each day. Start by walking a little more than you did the day before. Bit by bit, increase the amount you walk. Walking boosts blood flow and helps prevent pneumonia and constipation.     · Avoid strenuous activities, such as bicycle riding, jogging, weight lifting, or aerobic exercise. Your doctor will tell you when it's okay to do strenuous activity.     · For 1 to 2 weeks, avoid lifting anything that would make you strain.  This may include a child, heavy grocery bags and milk containers, a heavy briefcase or backpack, cat litter or dog food bags, or a vacuum .     · Ask your doctor when you can drive again.     · You will probably need to take 1 to 2 weeks off from work. It depends on the type of work you do and how you feel.     · You may shower and take baths as usual. But do not soak the incision for the first 2 weeks, or until your doctor tells you it is okay. Pat the incision dry.     · Your doctor will tell you when you can have sex again. Diet    · You can eat your normal diet. If your stomach is upset, try bland, low-fat foods like plain rice, broiled chicken, toast, and yogurt.     · Drink plenty of fluids (unless your doctor tells you not to).     · You may notice that your bowel movements are not regular right after your surgery. This is common. Try to avoid constipation and straining with bowel movements. You may want to take a fiber supplement every day. If you have not had a bowel movement after a couple of days, ask your doctor about taking a mild laxative. Medicines    · Your doctor will tell you if and when you can restart your medicines. He or she will also give you instructions about taking any new medicines.     · If you take blood thinners, such as warfarin (Coumadin), clopidogrel (Plavix), or aspirin, be sure to talk to your doctor. He or she will tell you if and when to start taking those medicines again. Make sure that you understand exactly what your doctor wants you to do.     · Your doctor may advise you to take aspirin when you go home. This helps prevent blood clots. Take your medicines exactly as prescribed. Call your doctor if you think you are having a problem with your medicine.     · Be safe with medicines. Take pain medicines exactly as directed. ? If the doctor gave you a prescription medicine for pain, take it as prescribed. ? If you are not taking a prescription pain medicine, ask your doctor if you can take an over-the-counter medicine.   ? Do not take two or more pain medicines at the same time unless the doctor told you to. Many pain medicines have acetaminophen, which is Tylenol. Too much acetaminophen (Tylenol) can be harmful.     · If you think your pain medicine is making you sick to your stomach:  ? Take your medicine after meals (unless your doctor has told you not to). ? Ask your doctor for a different pain medicine.     · If your doctor prescribed antibiotics, take them as directed. Do not stop taking them just because you feel better. You need to take the full course of antibiotics.     · If you take a blood thinner, such as aspirin, be sure you get instructions about how to take your medicine safely. Blood thinners can cause serious bleeding problems. Incision care    · If you have strips of tape over your incision, leave the tape on for a week or until it falls off.     · Wash the area daily with water and pat it dry. Other cleaning products, such as hydrogen peroxide, can make the wound heal more slowly. You may cover the area with a gauze bandage if it weeps or rubs against clothing. Change the bandage every day.     · Keep the area clean and dry. Follow-up care is a key part of your treatment and safety. Be sure to make and go to all appointments, and call your doctor if you are having problems. It's also a good idea to know your test results and keep a list of the medicines you take. When should you call for help? Call 911 anytime you think you may need emergency care. For example, call if:    · You passed out (lost consciousness).     · You have severe trouble breathing.     · You have a tight bulge in your neck on the side where the surgery was done.     · You have symptoms of a stroke. These may include:  ? Sudden numbness, tingling, weakness, or loss of movement in your face, arm, or leg, especially on only one side of your body. ? Sudden vision changes. ? Sudden trouble speaking.   ? Sudden confusion or trouble understanding simple statements. ? Sudden problems with walking or balance. ? A sudden, severe headache that is different from past headaches.     · You have chest pain or pressure. This may occur with:  ? Sweating. ? Shortness of breath. ? Nausea or vomiting. ? Pain that spreads from the chest to the neck, jaw, or one or both shoulders or arms. ? Dizziness or lightheadedness. ? A fast or uneven pulse. After calling 911, chew 1 adult-strength or 2 to 4 low-dose aspirin. Wait for an ambulance. Do not try to drive yourself.    Call your doctor now or seek immediate medical care if:    · You have pain that does not get better after you take pain medicine.     · You have loose stitches, or your incision comes open.     · Bright red blood has soaked through the bandage over your incision.     · You have signs of infection, such as:  ? Increased pain, swelling, warmth, or redness. ? Red streaks leading from the incision. ? Pus draining from the incision. ? A fever.    Watch closely for any changes in your health, and be sure to contact your doctor if you have any problems. Where can you learn more? Go to http://christin-bella.info/. Enter T697 in the search box to learn more about \"Carotid Endarterectomy: What to Expect at Home. \"  Current as of: July 22, 2018  Content Version: 11.9  © 0257-6932 [employer]. Care instructions adapted under license by [employer] (which disclaims liability or warranty for this information). If you have questions about a medical condition or this instruction, always ask your healthcare professional. [place] [address] any warranty or liability for your use of this information.

## 2019-06-09 ENCOUNTER — HOSPITAL ENCOUNTER (EMERGENCY)
Age: 59
Discharge: HOME | End: 2019-06-09
Attending: EMERGENCY MEDICINE | Admitting: EMERGENCY MEDICINE
Payer: COMMERCIAL

## 2019-06-09 VITALS
HEIGHT: 59 IN | BODY MASS INDEX: 38.71 KG/M2 | WEIGHT: 192 LBS | SYSTOLIC BLOOD PRESSURE: 100 MMHG | OXYGEN SATURATION: 95 % | DIASTOLIC BLOOD PRESSURE: 52 MMHG | TEMPERATURE: 98.8 F | RESPIRATION RATE: 17 BRPM | HEART RATE: 62 BPM

## 2019-06-09 DIAGNOSIS — I97.89: Primary | ICD-10-CM

## 2019-06-09 PROCEDURE — 99284 EMERGENCY DEPT VISIT MOD MDM: CPT

## 2019-06-09 NOTE — ED NOTES
Dr. Nohelia Reyes reviewed discharge instructions with the patient. The patient verbalized understanding. All questions and concerns were addressed. The patient declined a wheelchair and is discharged ambulatory in the care of family members with instructions and prescriptions in hand. Pt is alert and oriented x 4. Respirations are clear and unlabored.

## 2019-06-09 NOTE — ED NOTES
Pt presents from triage with c/o bleeding to incision to L neck since yesterday after drain was removed. Pt reports carotid endarectomy on Friday by Dr. Alex Reid. Patient states she talked to the on-call who instructed patient to come to the ED. Placed on cardiac monitor x3. VSS.

## 2019-06-14 NOTE — OP NOTES
[address]  OPERATIVE REPORT    Name:  Jd Parkinson  MR#:  [id number]  :  1960  ACCOUNT #:  [de-identified]  DATE OF SERVICE:  2019      PREOPERATIVE DIAGNOSIS:  Asymptomatic severe left carotid stenosis. POSTOPERATIVE DIAGNOSIS:  Asymptomatic severe left carotid stenosis. PROCEDURE PERFORMED:  Left carotid endarterectomy with Dacron patch. SURGEON:  Ifeanyi Reza MD.    ASSISTANT:  None. ANESTHESIA:  General.    COMPLICATIONS:  None. SPECIMENS REMOVED:  Carotid plaque. DRAINS:  A #10 Chidi-Zhao. IMPLANTS:  Dacron patch. ESTIMATED BLOOD LOSS:  100 mL. INDICATIONS:  The patient is a 29-year-old female with an asymptomatic greater than 80% left internal carotid artery stenosis. She presents for endarterectomy. PROCEDURE:  After informed consent was obtained, the patient was given preoperative intravenous antibiotics within 1 hour of the incision. She was taken to the operating room and after induction of adequate general anesthesia, the left neck was prepped and draped as a sterile field. An incision was made along the anterior border of the sternomastoid muscle and the platysma was divided with electrocautery. Dissection was carried down to the carotid sheath. The common facial vein was divided between 2-0 silk ties. The internal jugular vein and sternomastoid muscle were retracted laterally. The distal common carotid artery was dissected free. The vagus nerve was identified in its usual posterolateral position and protected from injury. Dissection was carried cephalad and the carotid bifurcation was exposed. There was obvious bulky calcified plaque within the carotid bifurcation and proximal internal carotid. Dissection was carried cephalad and the internal carotid artery was dissected free beyond any visible disease. The internal carotid artery was encircled with a vessel loop in the mid-segment.   The origin of the external carotid and the superior thyroid were encircled with vessel loops. The distal common carotid artery was encircled with a vessel loop and the patient was systemically heparinized. The internal carotid was occluded, followed by the external and then the common. A longitudinal arteriotomy was made extending from the distal common carotid artery through the carotid bulb and through a severe stenosis in the proximal internal carotid artery. There was good backbleeding from the internal carotid. A #10 Flowood shunt was placed without difficulty. An endarterectomy plane was developed and continued circumferentially in the distal common carotid artery. The plaque was transected proximal to any significant disease. The endarterectomy was continued cephalad and an eversion endarterectomy was performed on the external carotid. The plaque was elevated through the proximal internal carotid and feathered off to a good endpoint beyond any significant disease. All loose debris was removed from the lumen. The lumen was irrigated with heparinized saline and the proximal and distal end points were secured. The arteriotomy was closed with a Dacron patch using running 5-0 Abingdon-Saurabh suture. Prior to completing the closure, the shunt was removed and flushing maneuvers were performed. The lumen was irrigated with heparinized saline and the final sutures were placed. Flow was restored first to the external carotid and then to the internal carotid. There was good hemostasis along the suture line. There was normal low resistance flow in the internal carotid and normal flow in the external carotid when interrogated with a handheld Doppler. The neck wound was irrigated with antibiotic irrigation and confirmed to be hemostatic. A routine #10 Chidi-Zhao drain was placed through a separate stab incision at the base of the neck and secured with a 3-0 nylon suture.   The platysma was closed with running 3-0 Vicryl suture and the skin with 4-0 Monocryl suture. A dry dressing was applied. The patient was extubated and transferred to the PACU in stable condition. All counts were correct.       Ginger Avalos MD      [id number]  D:  06/14/2019 0:13  T:  06/14/2019 0:22  JOB #:  [id number]

## 2019-06-14 NOTE — DISCHARGE SUMMARY
Physician Discharge Summary     Patient ID:  Preston Degroot  [id number]  58 y.o.  1960    Admit Date: 6/7/2019    Discharge Date: 6/8/2019    Admission Diagnoses: Carotid stenosis, asymptomatic, left [I65.22]    Discharge Diagnoses: Active Problems:    Carotid stenosis, asymptomatic, left (6/7/2019)         Last Procedure: Procedure(s):  LEFT CAROTID ARTERY ENDARTERECTOMY      Hospital Course:   Normal hospital course for this procedure. Patient Instructions:   Cannot display discharge medications since this patient is not currently admitted.       Follow-up with Sharmin Pual MD      Signed:  Sharmin Paul MD  6/13/2019  11:37 PM   .

## 2019-06-15 NOTE — ED PROVIDER NOTES
EMERGENCY DEPARTMENT HISTORY AND PHYSICAL EXAM      Date: 6/9/2019  Patient Name: Sha Giron    History of Presenting Illness     Chief Complaint   Patient presents with    Bleeding/Bruising     per pt she has a port in her neck removed yesterday and she is here to get a \"stitch\"       History Provided By: Patient    HPI: Sha Giron, 62 y.o. female with PMHx significant for HTN, CAD, PVD, CHF, presents by POV to the ED with cc of bleeding. Pt stated she had a port removed by her vascular surgeon. Pt states since its removal she has had bleeding from distal wound. Despite bandages and pressure she has continued to have bleeding from the wound. She spoke with Vasc surgeon on call and directed to the ED for evaluation. There are no other complaints, changes, or physical findings at this time. PCP: Vaishnavi Romano MD    No current facility-administered medications on file prior to encounter. Current Outpatient Medications on File Prior to Encounter   Medication Sig Dispense Refill    lisinopril (PRINIVIL, ZESTRIL) 10 mg tablet Take 10 mg by mouth daily.  CALCIUM CARBONATE (CALCIUM 600 PO) Take 1,200 mg by mouth daily.  cholecalciferol (VITAMIN D3) 1,000 unit tablet Take 1,000 Units by mouth daily.  melatonin 3 mg tablet Take 1.5 mg by mouth nightly. 1/2 tablet qhs       ACETAMINOPHEN/DIPHENHYDRAMINE (TYLENOL PM PO) Take 1 Tab by mouth nightly as needed.  isosorbide mononitrate ER (IMDUR) 30 mg tablet Take 15 mg by mouth daily.  atorvastatin (LIPITOR) 80 mg tablet Take 80 mg by mouth nightly.  metoprolol-XL (TOPROL XL) 50 mg XL tablet Take 50 mg by mouth nightly.  aspirin (ASPIRIN) 325 mg tablet Take 325 mg by mouth daily.            Past History     Past Medical History:  Past Medical History:   Diagnosis Date    Arrhythmia     hx A-fib    Arthritis     CAD (coronary artery disease)     CABG 2010    GERD (gastroesophageal reflux disease)     [facility] failure ([facility] [address]     Hypertension     Other ill-defined conditions(799.89)     epidural injections    Pacemaker 2018    dual chamber    Psychiatric disorder     anxiety/depression, years ago per patient    PVD (peripheral vascular disease) ([address]     left iliac stent    Right thyroid nodule 2014    stable       Past Surgical History:  Past Surgical History:   Procedure Laterality Date    CABG, ARTERY-VEIN, THREE  2010    COLONOSCOPY,DIAGNOSTIC  3/6/2015        [facility] BACK [facility]      microdiscectomy 2009 L4-L5, and in     HX GYN      D&C    HX GYN       x3    HX HEENT      septoplasty    HX HYSTEROSCOPY WITH ENDOMETRIAL ABLATION      HX IMPLANTABLE CARDIOVERTER DEFIBRILLATOR  2011    removed in 2018    HX ORTHOPAEDIC      surgery on right hand with tendon cut and repaired    HX OTHER SURGICAL      angioplasty-left iliac    HX OTHER SURGICAL      EGD-Anand's Esophagus    HX TONSILLECTOMY      HX TUBAL LIGATION         Family History:  Family History   Problem Relation Age of Onset    Arthritis-rheumatoid Mother     Elevated Lipids Mother     Thyroid Disease Mother         hypothyroidism    Elevated Lipids Father     Stroke Father     Diabetes Father     Emphysema Father     Heart Disease Father     Heart Attack Brother 50    Heart Disease Maternal Grandmother     Hypertension Maternal Grandmother     Thyroid Disease Sister         hypothyroidism       Social History:  Social History     Tobacco Use    Smoking status: Former Smoker     Packs/day: 1.00     Years: 37.00     Pack years: 37.00     Last attempt to quit: 2010     Years since quittin.4    Smokeless tobacco: Never Used   Substance Use Topics    Alcohol use: Yes     Alcohol/week: 0.6 oz     Types: 1 Shots of liquor per week    Drug use: No       Allergies:  No Known Allergies      Review of Systems   Review of Systems   Constitutional: Negative. Negative for appetite change, chills, fatigue and fever.    HENT: Negative. Negative for congestion, rhinorrhea, sinus pressure and sore throat. Eyes: Negative. Respiratory: Negative. Negative for cough, shortness of breath and wheezing. Cardiovascular: Negative for chest pain, palpitations and leg swelling. Skin:        Open wound base left neck   Neurological: Negative. Negative for dizziness, speech difficulty, weakness, light-headedness, numbness and headaches. All other systems reviewed and are negative. Physical Exam   Physical Exam   Constitutional: She is oriented to person, place, and time. She appears well-developed and well-nourished. No distress. HENT:   Head: Normocephalic and atraumatic. Mouth/Throat: Oropharynx is clear and moist.   Eyes: Pupils are equal, round, and reactive to light. Conjunctivae and EOM are normal.   Neck:       Cardiovascular: Normal rate, regular rhythm and intact distal pulses. Pulmonary/Chest: Effort normal and breath sounds normal. No respiratory distress. She has no wheezes. Neurological: She is alert and oriented to person, place, and time. A cranial nerve deficit is present. Skin: Skin is warm and dry. Psychiatric: She has a normal mood and affect. Her behavior is normal.   Nursing note and vitals reviewed. Diagnostic Study Results     Labs -   No results found for this or any previous visit (from the past 12 hour(s)). Radiologic Studies -   No orders to display     CT Results  (Last 48 hours)    None        CXR Results  (Last 48 hours)    None            Medical Decision Making   I am the first provider for this patient. I reviewed the vital signs, available nursing notes, past medical history, past surgical history, family history and social history. Vital Signs-Reviewed the patient's vital signs.     Pulse Oximetry Analysis - 95% on RA      Records Reviewed: Nursing Notes and Old Medical Records    Provider Notes (Medical Decision Making):   DDx- Post-op wound complication    ED Course: Initial assessment performed. The patients presenting problems have been discussed, and they are in agreement with the care plan formulated and outlined with them. I have encouraged them to ask questions as they arise throughout their visit. Consult Note:   Discussed with Dr. Chapincito Cain, wound just needs figure stitch and pt can be discharged. Procedure Note:  Area cleansed and draped in sterile fashion. Are cleansed with sterile saline and chlorhexidine. 1cc Lidocaine with epi injected into wound. Wound closed with a single figure stitch, using 3-0 Nylon. Procedure tolerated well, no complications. Dressing applied. Wound <0.5 cm. Time 5 minutes, No blood loss. Kellogg Artesia Wells, DO    Critical Care Time:   None    Disposition:  DC home follow up with More Dai. PLAN:  1. Discharge Medication List as of 6/9/2019  3:29 PM        2. Follow-up Information     Follow up With Specialties Details Why Contact Info    Rosario Jolley MD Vascular Surgery   [address] [facility]  [address] [address] [phone]          Return to ED if worse     Diagnosis     Clinical Impression:   1.  Postoperative surgical complication involving circulatory system associated with other circulatory procedure, unspecified complication

## 2020-12-16 ENCOUNTER — TRANSCRIBE ORDER (OUTPATIENT)
Dept: SCHEDULING | Age: 60
End: 2020-12-16

## 2020-12-16 DIAGNOSIS — R10.84 GENERALIZED ABDOMINAL PAIN: Primary | ICD-10-CM

## 2021-01-05 ENCOUNTER — HOSPITAL ENCOUNTER (OUTPATIENT)
Dept: ULTRASOUND IMAGING | Age: 61
Discharge: HOME | End: 2021-01-05
Attending: NURSE PRACTITIONER
Payer: COMMERCIAL

## 2021-01-05 DIAGNOSIS — R10.84 GENERALIZED ABDOMINAL PAIN: ICD-10-CM

## 2021-01-05 PROCEDURE — 76700 US EXAM ABDOM COMPLETE: CPT

## 2021-02-23 ENCOUNTER — TRANSCRIBE ORDER (OUTPATIENT)
Dept: SCHEDULING | Age: 61
End: 2021-02-23

## 2021-02-23 DIAGNOSIS — R10.13 EPIGASTRIC PAIN: Primary | ICD-10-CM

## 2021-02-23 DIAGNOSIS — R63.4 WEIGHT LOSS, UNINTENTIONAL: ICD-10-CM

## 2021-03-05 ENCOUNTER — HOSPITAL ENCOUNTER (OUTPATIENT)
Dept: CT IMAGING | Age: 61
Discharge: HOME | End: 2021-03-05
Attending: NURSE PRACTITIONER
Payer: COMMERCIAL

## 2021-03-05 DIAGNOSIS — R10.13 EPIGASTRIC PAIN: ICD-10-CM

## 2021-03-05 DIAGNOSIS — R63.4 WEIGHT LOSS, UNINTENTIONAL: ICD-10-CM

## 2021-03-05 LAB — CREAT BLD-MCNC: 1.1 MG/DL (ref 0.6–1.3)

## 2021-03-05 PROCEDURE — 74011000636 HC RX REV CODE- 636: Performed by: NURSE PRACTITIONER

## 2021-03-05 PROCEDURE — 74011000250 HC RX REV CODE- 250: Performed by: NURSE PRACTITIONER

## 2021-03-05 PROCEDURE — 74177 CT ABD & PELVIS W/CONTRAST: CPT

## 2021-03-05 PROCEDURE — 82565 ASSAY OF CREATININE: CPT

## 2021-03-05 RX ORDER — BARIUM SULFATE 20 MG/ML
900 SUSPENSION ORAL
Status: COMPLETED | OUTPATIENT
Start: 2021-03-05 | End: 2021-03-05

## 2021-03-05 RX ADMIN — IOPAMIDOL 100 ML: 755 INJECTION, SOLUTION INTRAVENOUS at 09:22

## 2021-03-05 RX ADMIN — BARIUM SULFATE 900 ML: 21 SUSPENSION ORAL at 09:22

## 2021-03-22 NOTE — PERIOP NOTES
[facility]  Preoperative Instructions        Surgery Date 4/2/21          Time of Arrival 1:30 pm  Covid Test 3/29/21 7am-1145am    1. On the day of your surgery, please report to the Surgical Services Registration Desk and sign in at your designated time. The Surgery Center is located to the right of the Emergency Room. 2. You must have someone with you to drive you home. You should not drive a car for 24 hours following surgery. Please make arrangements for a friend or family member to stay with you for the first 24 hours after your surgery. 3. Do not have anything to eat or drink (including water, gum, mints, coffee, juice) after midnight. ?This may not apply to medications prescribed by your physician. ?(Please note below the special instructions with medications to take the morning of your procedure.)    4. We recommend you do not drink any alcoholic beverages for 24 hours before and after your surgery. 5. Contact your surgeons office for instructions on the following medications: non-steroidal anti-inflammatory drugs (i.e. Advil, Aleve), vitamins, and supplements. (Some surgeons will want you to stop these medications prior to surgery and others may allow you to take them)  **If you are currently taking Plavix, Coumadin, Aspirin and/or other blood-thinning agents, contact your surgeon for instructions. ** Your surgeon will partner with the physician prescribing these medications to determine if it is safe to stop or if you need to continue taking. Please do not stop taking these medications without instructions from your surgeon    6. Wear comfortable clothes. Wear glasses instead of contacts. Do not bring any money or jewelry. Please bring picture ID, insurance card, and any prearranged co-payment or hospital payment. Do not wear make-up, particularly mascara the morning of your surgery. Do not wear nail polish, particularly if you are having foot /hand surgery. Wear your hair loose or down, no ponytails, buns, rehana pins or clips. All body piercings must be removed. Please shower with antibacterial soap for three consecutive days before and on the morning of surgery, but do not apply any lotions, powders or deodorants after the shower on the day of surgery. Please use a fresh towels after each shower. Please sleep in clean clothes and change bed linens the night before surgery. Please do not shave for 48 hours prior to surgery. Shaving of the face is acceptable. 7. You should understand that if you do not follow these instructions your surgery may be cancelled. If your physical condition changes (I.e. fever, cold or flu) please contact your surgeon as soon as possible. 8. It is important that you be on time. If a situation occurs where you may be late, please call [phone] (OR Holding Area). 9. If you have any questions and or problems, please call [phone] (Pre-admission Testing). 10. Your surgery time may be subject to change. You will receive a phone call the evening prior if your time changes. 11.  If having outpatient surgery, you must have someone to drive you here, stay with you during the duration of your stay, and to drive you home at time of discharge. Special Instructions:     TAKE ALL MEDICATIONS DAY OF SURGERY EXCEPT: lisinopril      I understand a pre-operative phone call will be made to verify my surgery time. In the event that I am not available, I give permission for a message to be left on my answering service and/or with another person?   Yes [phone]         ___________________      __________   _________    (Signature of Patient)             (Witness)                (Date and Time)

## 2021-03-29 ENCOUNTER — HOSPITAL ENCOUNTER (OUTPATIENT)
Dept: PREADMISSION TESTING | Age: 61
Discharge: HOME | End: 2021-03-29
Payer: COMMERCIAL

## 2021-03-29 LAB — SARS-COV-2, COV2: NORMAL

## 2021-03-29 PROCEDURE — U0003 INFECTIOUS AGENT DETECTION BY NUCLEIC ACID (DNA OR RNA); SEVERE ACUTE RESPIRATORY SYNDROME CORONAVIRUS 2 (SARS-COV-2) (CORONAVIRUS DISEASE [COVID-19]), AMPLIFIED PROBE TECHNIQUE, MAKING USE OF HIGH THROUGHPUT TECHNOLOGIES AS DESCRIBED BY CMS-2020-01-R: HCPCS

## 2021-03-30 LAB — SARS-COV-2, COV2NT: NOT DETECTED

## 2021-04-02 ENCOUNTER — APPOINTMENT (OUTPATIENT)
Dept: GENERAL RADIOLOGY | Age: 61
End: 2021-04-02
Attending: SURGERY
Payer: COMMERCIAL

## 2021-04-02 ENCOUNTER — ANESTHESIA EVENT (OUTPATIENT)
Dept: SURGERY | Age: 61
End: 2021-04-02
Payer: COMMERCIAL

## 2021-04-02 ENCOUNTER — ANESTHESIA (OUTPATIENT)
Dept: SURGERY | Age: 61
End: 2021-04-02
Payer: COMMERCIAL

## 2021-04-02 ENCOUNTER — HOSPITAL ENCOUNTER (OUTPATIENT)
Age: 61
Setting detail: OUTPATIENT SURGERY
Discharge: HOME | End: 2021-04-02
Attending: SURGERY | Admitting: SURGERY
Payer: COMMERCIAL

## 2021-04-02 VITALS
HEIGHT: 60 IN | SYSTOLIC BLOOD PRESSURE: 104 MMHG | TEMPERATURE: 97.7 F | WEIGHT: 153.22 LBS | BODY MASS INDEX: 30.08 KG/M2 | DIASTOLIC BLOOD PRESSURE: 51 MMHG | OXYGEN SATURATION: 93 % | HEART RATE: 60 BPM | RESPIRATION RATE: 16 BRPM

## 2021-04-02 DIAGNOSIS — K55.1 CHRONIC MESENTERIC ISCHEMIA (HCC): Primary | ICD-10-CM

## 2021-04-02 LAB — HGB BLD-MCNC: 13.4 G/DL (ref 11.5–16)

## 2021-04-02 PROCEDURE — 74011250636 HC RX REV CODE- 250/636

## 2021-04-02 PROCEDURE — C1876 STENT, NON-COA/NON-COV W/DEL: HCPCS | Performed by: SURGERY

## 2021-04-02 PROCEDURE — 85018 HEMOGLOBIN: CPT

## 2021-04-02 PROCEDURE — 74011000250 HC RX REV CODE- 250: Performed by: NURSE ANESTHETIST, CERTIFIED REGISTERED

## 2021-04-02 PROCEDURE — 77030013079 HC BLNKT BAIR HGGR 3M -A: Performed by: ANESTHESIOLOGY

## 2021-04-02 PROCEDURE — 74011250636 HC RX REV CODE- 250/636: Performed by: ANESTHESIOLOGY

## 2021-04-02 PROCEDURE — 2709999900 HC NON-CHARGEABLE SUPPLY: Performed by: SURGERY

## 2021-04-02 PROCEDURE — 74011250637 HC RX REV CODE- 250/637: Performed by: SURGERY

## 2021-04-02 PROCEDURE — 77030002933 HC SUT MCRYL J&J -A: Performed by: SURGERY

## 2021-04-02 PROCEDURE — C1876 STENT, NON-COA/NON-COV W/DEL: HCPCS

## 2021-04-02 PROCEDURE — C1887 CATHETER, GUIDING: HCPCS | Performed by: SURGERY

## 2021-04-02 PROCEDURE — C1769 GUIDE WIRE: HCPCS | Performed by: SURGERY

## 2021-04-02 PROCEDURE — 74011250636 HC RX REV CODE- 250/636: Performed by: SURGERY

## 2021-04-02 PROCEDURE — 76000 FLUOROSCOPY <1 HR PHYS/QHP: CPT

## 2021-04-02 PROCEDURE — 73502 X-RAY EXAM HIP UNI 2-3 VIEWS: CPT

## 2021-04-02 PROCEDURE — 77030040922 HC BLNKT HYPOTHRM STRY -A

## 2021-04-02 PROCEDURE — 74011250636 HC RX REV CODE- 250/636: Performed by: NURSE ANESTHETIST, CERTIFIED REGISTERED

## 2021-04-02 PROCEDURE — 76210000006 HC OR PH I REC 0.5 TO 1 HR: Performed by: SURGERY

## 2021-04-02 PROCEDURE — 74011000250 HC RX REV CODE- 250: Performed by: SURGERY

## 2021-04-02 PROCEDURE — 74011000258 HC RX REV CODE- 258: Performed by: NURSE ANESTHETIST, CERTIFIED REGISTERED

## 2021-04-02 PROCEDURE — 76060000035 HC ANESTHESIA 2 TO 2.5 HR: Performed by: SURGERY

## 2021-04-02 PROCEDURE — 77030004549 HC CATH ANGI DX PRF MRTM -A

## 2021-04-02 PROCEDURE — 77030002986 HC SUT PROL J&J -A: Performed by: SURGERY

## 2021-04-02 PROCEDURE — 76210000021 HC REC RM PH II 0.5 TO 1 HR: Performed by: SURGERY

## 2021-04-02 PROCEDURE — C1725 CATH, TRANSLUMIN NON-LASER: HCPCS | Performed by: SURGERY

## 2021-04-02 PROCEDURE — 77030010507 HC ADH SKN DERMBND J&J -B: Performed by: SURGERY

## 2021-04-02 PROCEDURE — 77030031139 HC SUT VCRL2 J&J -A: Performed by: SURGERY

## 2021-04-02 PROCEDURE — 77030004549 HC CATH ANGI DX PRF MRTM -A: Performed by: SURGERY

## 2021-04-02 PROCEDURE — 77030002996 HC SUT SLK J&J -A: Performed by: SURGERY

## 2021-04-02 PROCEDURE — C1892 INTRO/SHEATH,FIXED,PEEL-AWAY: HCPCS | Performed by: SURGERY

## 2021-04-02 PROCEDURE — 74011000636 HC RX REV CODE- 636: Performed by: SURGERY

## 2021-04-02 PROCEDURE — 2709999900 HC NON-CHARGEABLE SUPPLY

## 2021-04-02 PROCEDURE — 76010000171 HC OR TIME 2 TO 2.5 HR INTENSV-TIER 1: Performed by: SURGERY

## 2021-04-02 PROCEDURE — 77030002987 HC SUT PROL J&J -B: Performed by: SURGERY

## 2021-04-02 PROCEDURE — 77030013058 HC DEV INFL ANGIO BSC -B: Performed by: SURGERY

## 2021-04-02 PROCEDURE — P9045 ALBUMIN (HUMAN), 5%, 250 ML: HCPCS | Performed by: NURSE ANESTHETIST, CERTIFIED REGISTERED

## 2021-04-02 PROCEDURE — 77030020263 HC SOL INJ SOD CL0.9% LFCR 1000ML: Performed by: SURGERY

## 2021-04-02 PROCEDURE — P9045 ALBUMIN (HUMAN), 5%, 250 ML: HCPCS

## 2021-04-02 DEVICE — PREMOUNTED STENT SYSTEM
Type: IMPLANTABLE DEVICE | Site: MESENTERIC | Status: FUNCTIONAL
Brand: EXPRESS® SD RENAL/BILIARY

## 2021-04-02 RX ORDER — CLOPIDOGREL BISULFATE 75 MG/1
75 TABLET ORAL DAILY
Qty: 30 TAB | Refills: 11 | Status: SHIPPED | OUTPATIENT
Start: 2021-04-02 | End: 2022-02-25

## 2021-04-02 RX ORDER — MIDAZOLAM HYDROCHLORIDE 1 MG/ML
INJECTION, SOLUTION INTRAMUSCULAR; INTRAVENOUS AS NEEDED
Status: DISCONTINUED | OUTPATIENT
Start: 2021-04-02 | End: 2021-04-02 | Stop reason: HOSPADM

## 2021-04-02 RX ORDER — LIDOCAINE HYDROCHLORIDE 20 MG/ML
INJECTION, SOLUTION EPIDURAL; INFILTRATION; INTRACAUDAL; PERINEURAL AS NEEDED
Status: DISCONTINUED | OUTPATIENT
Start: 2021-04-02 | End: 2021-04-02 | Stop reason: HOSPADM

## 2021-04-02 RX ORDER — SODIUM CHLORIDE 0.9 % (FLUSH) 0.9 %
5-40 SYRINGE (ML) INJECTION EVERY 8 HOURS
Status: DISCONTINUED | OUTPATIENT
Start: 2021-04-02 | End: 2021-04-02 | Stop reason: HOSPADM

## 2021-04-02 RX ORDER — LIDOCAINE HYDROCHLORIDE AND EPINEPHRINE 10; 10 MG/ML; UG/ML
INJECTION, SOLUTION INFILTRATION; PERINEURAL AS NEEDED
Status: DISCONTINUED | OUTPATIENT
Start: 2021-04-02 | End: 2021-04-02 | Stop reason: HOSPADM

## 2021-04-02 RX ORDER — SODIUM CHLORIDE, SODIUM LACTATE, POTASSIUM CHLORIDE, CALCIUM CHLORIDE 600; 310; 30; 20 MG/100ML; MG/100ML; MG/100ML; MG/100ML
25 INJECTION, SOLUTION INTRAVENOUS CONTINUOUS
Status: DISCONTINUED | OUTPATIENT
Start: 2021-04-02 | End: 2021-04-02 | Stop reason: HOSPADM

## 2021-04-02 RX ORDER — DEXMEDETOMIDINE HYDROCHLORIDE 100 UG/ML
INJECTION, SOLUTION INTRAVENOUS AS NEEDED
Status: DISCONTINUED | OUTPATIENT
Start: 2021-04-02 | End: 2021-04-02 | Stop reason: HOSPADM

## 2021-04-02 RX ORDER — MORPHINE SULFATE 2 MG/ML
2 INJECTION, SOLUTION INTRAMUSCULAR; INTRAVENOUS
Status: DISCONTINUED | OUTPATIENT
Start: 2021-04-02 | End: 2021-04-02 | Stop reason: HOSPADM

## 2021-04-02 RX ORDER — ALBUMIN HUMAN 50 G/1000ML
SOLUTION INTRAVENOUS
Status: COMPLETED
Start: 2021-04-02 | End: 2021-04-02

## 2021-04-02 RX ORDER — HYDROMORPHONE HYDROCHLORIDE 1 MG/ML
.2-.5 INJECTION, SOLUTION INTRAMUSCULAR; INTRAVENOUS; SUBCUTANEOUS
Status: DISCONTINUED | OUTPATIENT
Start: 2021-04-02 | End: 2021-04-02 | Stop reason: HOSPADM

## 2021-04-02 RX ORDER — MIDAZOLAM HYDROCHLORIDE 1 MG/ML
0.5 INJECTION, SOLUTION INTRAMUSCULAR; INTRAVENOUS
Status: DISCONTINUED | OUTPATIENT
Start: 2021-04-02 | End: 2021-04-02 | Stop reason: HOSPADM

## 2021-04-02 RX ORDER — EPHEDRINE SULFATE/0.9% NACL/PF 50 MG/5 ML
SYRINGE (ML) INTRAVENOUS AS NEEDED
Status: DISCONTINUED | OUTPATIENT
Start: 2021-04-02 | End: 2021-04-02 | Stop reason: HOSPADM

## 2021-04-02 RX ORDER — PROPOFOL 10 MG/ML
INJECTION, EMULSION INTRAVENOUS AS NEEDED
Status: DISCONTINUED | OUTPATIENT
Start: 2021-04-02 | End: 2021-04-02 | Stop reason: HOSPADM

## 2021-04-02 RX ORDER — HEPARIN SODIUM 1000 [USP'U]/ML
INJECTION, SOLUTION INTRAVENOUS; SUBCUTANEOUS AS NEEDED
Status: DISCONTINUED | OUTPATIENT
Start: 2021-04-02 | End: 2021-04-02 | Stop reason: HOSPADM

## 2021-04-02 RX ORDER — LIDOCAINE HYDROCHLORIDE 10 MG/ML
0.1 INJECTION, SOLUTION EPIDURAL; INFILTRATION; INTRACAUDAL; PERINEURAL AS NEEDED
Status: DISCONTINUED | OUTPATIENT
Start: 2021-04-02 | End: 2021-04-02 | Stop reason: HOSPADM

## 2021-04-02 RX ORDER — ONDANSETRON 2 MG/ML
INJECTION INTRAMUSCULAR; INTRAVENOUS AS NEEDED
Status: DISCONTINUED | OUTPATIENT
Start: 2021-04-02 | End: 2021-04-02 | Stop reason: HOSPADM

## 2021-04-02 RX ORDER — SODIUM CHLORIDE 0.9 % (FLUSH) 0.9 %
5-40 SYRINGE (ML) INJECTION AS NEEDED
Status: DISCONTINUED | OUTPATIENT
Start: 2021-04-02 | End: 2021-04-02 | Stop reason: HOSPADM

## 2021-04-02 RX ORDER — HYDROCODONE BITARTRATE AND ACETAMINOPHEN 5; 325 MG/1; MG/1
1 TABLET ORAL
Qty: 18 TAB | Refills: 0 | Status: SHIPPED | OUTPATIENT
Start: 2021-04-02 | End: 2021-04-05

## 2021-04-02 RX ORDER — ONDANSETRON 2 MG/ML
4 INJECTION INTRAMUSCULAR; INTRAVENOUS AS NEEDED
Status: DISCONTINUED | OUTPATIENT
Start: 2021-04-02 | End: 2021-04-02 | Stop reason: HOSPADM

## 2021-04-02 RX ORDER — PROPOFOL 10 MG/ML
INJECTION, EMULSION INTRAVENOUS
Status: DISCONTINUED | OUTPATIENT
Start: 2021-04-02 | End: 2021-04-02 | Stop reason: HOSPADM

## 2021-04-02 RX ORDER — FENTANYL CITRATE 50 UG/ML
INJECTION, SOLUTION INTRAMUSCULAR; INTRAVENOUS AS NEEDED
Status: DISCONTINUED | OUTPATIENT
Start: 2021-04-02 | End: 2021-04-02 | Stop reason: HOSPADM

## 2021-04-02 RX ORDER — EPHEDRINE SULFATE/0.9% NACL/PF 50 MG/5 ML
SYRINGE (ML) INTRAVENOUS
Status: COMPLETED
Start: 2021-04-02 | End: 2021-04-02

## 2021-04-02 RX ORDER — DIPHENHYDRAMINE HYDROCHLORIDE 50 MG/ML
12.5 INJECTION, SOLUTION INTRAMUSCULAR; INTRAVENOUS AS NEEDED
Status: DISCONTINUED | OUTPATIENT
Start: 2021-04-02 | End: 2021-04-02 | Stop reason: HOSPADM

## 2021-04-02 RX ORDER — ALBUMIN HUMAN 50 G/1000ML
SOLUTION INTRAVENOUS AS NEEDED
Status: DISCONTINUED | OUTPATIENT
Start: 2021-04-02 | End: 2021-04-02 | Stop reason: HOSPADM

## 2021-04-02 RX ORDER — HYDROCODONE BITARTRATE AND ACETAMINOPHEN 5; 325 MG/1; MG/1
1 TABLET ORAL
Status: DISCONTINUED | OUTPATIENT
Start: 2021-04-02 | End: 2021-04-02 | Stop reason: HOSPADM

## 2021-04-02 RX ORDER — FENTANYL CITRATE 50 UG/ML
25 INJECTION, SOLUTION INTRAMUSCULAR; INTRAVENOUS
Status: DISCONTINUED | OUTPATIENT
Start: 2021-04-02 | End: 2021-04-02 | Stop reason: HOSPADM

## 2021-04-02 RX ORDER — FENTANYL CITRATE 50 UG/ML
50 INJECTION, SOLUTION INTRAMUSCULAR; INTRAVENOUS AS NEEDED
Status: DISCONTINUED | OUTPATIENT
Start: 2021-04-02 | End: 2021-04-02 | Stop reason: HOSPADM

## 2021-04-02 RX ORDER — PHENYLEPHRINE HCL IN 0.9% NACL 0.4MG/10ML
SYRINGE (ML) INTRAVENOUS AS NEEDED
Status: DISCONTINUED | OUTPATIENT
Start: 2021-04-02 | End: 2021-04-02 | Stop reason: HOSPADM

## 2021-04-02 RX ADMIN — LIDOCAINE HYDROCHLORIDE 40 MG: 20 INJECTION, SOLUTION EPIDURAL; INFILTRATION; INTRACAUDAL; PERINEURAL at 11:00

## 2021-04-02 RX ADMIN — Medication 3 AMPULE: at 08:43

## 2021-04-02 RX ADMIN — FENTANYL CITRATE 50 MCG: 50 INJECTION, SOLUTION INTRAMUSCULAR; INTRAVENOUS at 12:21

## 2021-04-02 RX ADMIN — PROPOFOL 50 MCG/KG/MIN: 10 INJECTION, EMULSION INTRAVENOUS at 11:10

## 2021-04-02 RX ADMIN — SODIUM CHLORIDE, POTASSIUM CHLORIDE, SODIUM LACTATE AND CALCIUM CHLORIDE: 600; 310; 30; 20 INJECTION, SOLUTION INTRAVENOUS at 13:10

## 2021-04-02 RX ADMIN — DEXMEDETOMIDINE HYDROCHLORIDE 10 MCG: 100 INJECTION, SOLUTION, CONCENTRATE INTRAVENOUS at 11:05

## 2021-04-02 RX ADMIN — DEXMEDETOMIDINE HYDROCHLORIDE 10 MCG: 100 INJECTION, SOLUTION, CONCENTRATE INTRAVENOUS at 12:27

## 2021-04-02 RX ADMIN — FENTANYL CITRATE 25 MCG: 50 INJECTION, SOLUTION INTRAMUSCULAR; INTRAVENOUS at 13:30

## 2021-04-02 RX ADMIN — ALBUMIN (HUMAN) 250 ML: 2.5 SOLUTION INTRAVENOUS at 13:23

## 2021-04-02 RX ADMIN — PHENYLEPHRINE HYDROCHLORIDE 25 MCG/MIN: 10 INJECTION INTRAVENOUS at 11:50

## 2021-04-02 RX ADMIN — Medication 80 MCG: at 13:00

## 2021-04-02 RX ADMIN — MIDAZOLAM HYDROCHLORIDE 1.5 MG: 1 INJECTION, SOLUTION INTRAMUSCULAR; INTRAVENOUS at 10:57

## 2021-04-02 RX ADMIN — HEPARIN SODIUM 6000 UNITS: 1000 INJECTION, SOLUTION INTRAVENOUS; SUBCUTANEOUS at 11:34

## 2021-04-02 RX ADMIN — MIDAZOLAM HYDROCHLORIDE 1 MG: 1 INJECTION, SOLUTION INTRAMUSCULAR; INTRAVENOUS at 10:45

## 2021-04-02 RX ADMIN — ALBUMIN (HUMAN) 12.5 G: 12.5 INJECTION, SOLUTION INTRAVENOUS at 13:20

## 2021-04-02 RX ADMIN — Medication 80 MCG: at 11:47

## 2021-04-02 RX ADMIN — SODIUM CHLORIDE, POTASSIUM CHLORIDE, SODIUM LACTATE AND CALCIUM CHLORIDE 25 ML/HR: 600; 310; 30; 20 INJECTION, SOLUTION INTRAVENOUS at 08:43

## 2021-04-02 RX ADMIN — Medication 120 MCG: at 11:40

## 2021-04-02 RX ADMIN — Medication 120 MCG: at 12:55

## 2021-04-02 RX ADMIN — PROPOFOL 50 MG: 10 INJECTION, EMULSION INTRAVENOUS at 11:00

## 2021-04-02 RX ADMIN — FENTANYL CITRATE 50 MCG: 50 INJECTION, SOLUTION INTRAMUSCULAR; INTRAVENOUS at 11:21

## 2021-04-02 RX ADMIN — PROPOFOL 20 MG: 10 INJECTION, EMULSION INTRAVENOUS at 11:20

## 2021-04-02 RX ADMIN — ONDANSETRON HYDROCHLORIDE 4 MG: 2 INJECTION, SOLUTION INTRAMUSCULAR; INTRAVENOUS at 13:04

## 2021-04-02 RX ADMIN — Medication 20 MG: at 13:19

## 2021-04-02 RX ADMIN — Medication 80 MCG: at 11:18

## 2021-04-02 NOTE — ANESTHESIA POSTPROCEDURE EVALUATION
Procedure(s): MESENTERIC ARTERIOGRAM, ANGIOPLASTY AND STENTING VIA LEFT BRACHIAL CUTDOWN.    MAC    Anesthesia Post Evaluation      Multimodal analgesia: multimodal analgesia used between 6 hours prior to anesthesia start to PACU discharge  Patient location during evaluation: bedside  Patient participation: complete - patient participated  Level of consciousness: awake  Pain management: adequate  Airway patency: patent  Anesthetic complications: no  Cardiovascular status: acceptable  Respiratory status: acceptable  Hydration status: acceptable  Post anesthesia nausea and vomiting:  controlled  Final Post Anesthesia Temperature Assessment:  Normothermia (36.0-37.5 degrees C)      INITIAL Post-op Vital signs:   Vitals Value Taken Time   /53 04/02/21 1413   Temp 36.6 °C (97.8 °F) 04/02/21 1412   Pulse 60 04/02/21 1415   Resp 11 04/02/21 1415   SpO2 95 % 04/02/21 1415   Vitals shown include unvalidated device data.

## 2021-04-02 NOTE — DISCHARGE INSTRUCTIONS
Patient Discharge Instructions    Sourav Piper / [id number] : 1960    Admitted 2021 Discharged: 2021     Take Home Medications     · It is important that you take the medication exactly as they are prescribed. · Keep your medication in the bottles provided by the pharmacist and keep a list of the medication names, dosages, and times to be taken in your wallet. · Do not take other medications without consulting your doctor. What to do at [address] [place] [address] [place]: None needed. OK to shower. Recommended diet: AHA    Recommended activity: As Tolerated. No Strenuous activity or heavy lifting with left arm for 2 weeks    If you experience any of the following symptoms severe abdominal pain or severe nausea and vomiting, please go to the Emergency Department. Follow-up with Dr Huang Henry in 2-4 weeks [phone]        Information obtained by :  I understand that if any problems occur once I am at home I am to contact my physician. I understand and acknowledge receipt of the instructions indicated above.                                                                                                                                            Physician's or R.N.'s Signature                                                                  Date/Time                                                                                                                                              Patient or Representative Signature                                                          Date/Time  DISCHARGE SUMMARY from Nurse    PATIENT INSTRUCTIONS:    After general anesthesia or intravenous sedation, for 24 hours or while taking prescription Narcotics:  · Limit your activities  · Do not drive and operate hazardous machinery  · Do not make important personal or business decisions  · Do  not drink alcoholic beverages  · If you have not urinated within 8 hours after discharge, please contact your surgeon on call.    Report the following to your surgeon:  · Excessive pain, swelling, redness or odor of or around the surgical area  · Temperature over 100.5  · Nausea and vomiting lasting longer than 4 hours or if unable to take medications  · Any signs of decreased circulation or nerve impairment to extremity: change in color, persistent  numbness, tingling, coldness or increase pain  · Any questions    What to do at Home:  A common side effect of anesthesia following surgery is nausea and/or vomiting. In order to decrease symptoms, it is wise to avoid foods that are high in fat, greasy foods, milk products, and spicy foods for the first 24 hours. Acceptable foods for the first 24 hours following surgery include but are not limited to:     soup   broth    toast    crackers    applesauce    bananas    mashed potatoes,   soft or scrambled eggs   oatmeal    jello    It is important to eat when taking your pain medication. This will help to prevent nausea. If possible, please try to time your meals with your medications. It is very important to stay hydrated following surgery. Sip fluids frequently while awake. Avoid acidic drinks such as citrus juices and soda for 24 hours. Carbonated beverages may cause bloating and gas. Acceptable fluids include:    - water (flavor packets may add variety)  - coffee or tea (in moderation)  - Gatorade  - Tarun-aid  - apple juice  - cranberry juice    You are encouraged to cough and deep breathe every hour when awake. This will help to prevent respiratory complications following anesthesia. You may want to hug a pillow when coughing and sneezing to add additional support to the surgical area and to decrease discomfort if you had abdominal or chest surgery. If you are discharged home with support stockings, you may remove them after 24 hours. Support stockings are used to help prevent blood clots in the legs following surgery.     Please take time to review all of your Home Care Instructions and Medication Information sheets provided in your discharge packet. If you have any questions, please contact your surgeons office. Thank you. These are general instructions for a healthy lifestyle:    No smoking/ No tobacco products/ Avoid exposure to second hand smoke  Surgeon General's Warning:  Quitting smoking now greatly reduces serious risk to your health. Obesity, smoking, and sedentary lifestyle greatly increases your risk for illness    A healthy diet, regular physical exercise & weight monitoring are important for maintaining a healthy lifestyle    You may be retaining fluid if you have a history of heart failure or if you experience any of the following symptoms:  Weight gain of 3 pounds or more overnight or 5 pounds in a week, increased swelling in our hands or feet or shortness of breath while lying flat in bed. Please call your doctor as soon as you notice any of these symptoms; do not wait until your next office visit. How to Care for Your Wound After Its Treated With  DERMABOND* Topical Skin Adhesive  DERMABOND* Topical Skin Adhesive (2-octyl cyanoacrylate) is a sterile, liquid skin adhesive  that holds wound edges together. The film will usually remain in place for 5 to 10 days, then  naturally fall off your skin. The following will answer some of your questions and provide instructions for proper care for your  wound while it is healing:    CHECK WOUND APPEARANCE   Some swelling, redness, and pain are common with all wounds and normally will go away as the  wound heals. If swelling, redness, or pain increases or if the wound feels warm to the touch,  contact a doctor. Also contact a doctor if the wound edges reopen or separate. REPLACE BANDAGES   If your wound is bandaged, keep the bandage dry.    Replace the dressing daily until the adhesive film has fallen off or if the  bandage should become wet, unless otherwise instructed by your  physician.  When changing the dressing, do not place tape directly over the  DERMABOND adhesive film, because removing the tape later may also  remove the film. AVOID TOPICAL MEDICATIONS   Do not apply liquid or ointment medications or any other product to your wound while the  DERMABOND adhesive film is in place. These may loosen the film before your wound is healed. KEEP WOUND DRY AND PROTECTED   You may occasionally and briefly wet your wound in the shower or bath. Do not soak or scrub  your wound, do not swim, and avoid periods of heavy perspiration until the DERMABOND  adhesive has naturally fallen off. After showering or bathing, gently blot your wound dry with a  soft towel. If a protective dressing is being used, apply a fresh, dry bandage, being sure to keep  the tape off the DERMABOND adhesive film.  Apply a clean, dry bandage over the wound if necessary to protect it.  Protect your wound from injury until the skin has had sufficient time to heal.   Do not scratch, rub, or pick at the DERMABOND adhesive film. This may loosen the film before  your wound is healed.  Protect the wound from prolonged exposure to sunlight or tanning lamps while the film is in  place. If you have any questions or concerns about this product, please consult your doctor. *Trademark ©[employer] 0640YU PREVENT AN INFECTION      1. [address] YOUR HANDS     To prevent infection, good handwashing is the most important thing you or your caregiver can do.  Wash your hands with soap and water or use the hand  we gave you before you touch any wounds. 2. SHOWER     Use the antibacterial soap we gave you when you take a shower.  Shower with this soap until your wounds are healed.  To reach all areas of your body, you may need someone to help you.  Dont forget to clean your belly button with every shower. 3.  USE CLEAN SHEETS     Use freshly cleaned sheets on your bed after surgery.  To keep the surgery site clean, do not allow pets to sleep with you while your wound is still healing. 4. STOP SMOKING     Stop smoking, or at least cut back on smoking     Smoking slows your healing. 5.  CONTROL YOUR BLOOD SUGAR     High blood sugars slow wound healing. If you are diabetic, control your blood sugar levels before and after your surgery. Patient Education      Narcotic-Analgesic/Acetaminophen (Percocet, [address] [facility], [facility], Lortab 10/325) - (By mouth)   Why this medicine is used:   Relieves pain. Contact a nurse or doctor right away if you have:  · Extreme weakness, shallow breathing, slow heartbeat  · Severe confusion, lightheadedness, dizziness, fainting  · Yellow skin or eyes, dark urine or pale stools  · Severe constipation, severe stomach pain, nausea, vomiting, loss of appetite  · Sweating or cold, clammy skin     Common side effects:  · Mild constipation, nausea, vomiting  · Sleepiness, tiredness  · Itching, rash  © 2017 [facility] Information is for End User's use only and may not be sold, redistributed or otherwise used for commercial purposes. The discharge information has been reviewed with the {PATIENT PARENT GUARDIAN:88338}. The {PATIENT PARENT GUARDIAN:87945} verbalized understanding. Discharge medications reviewed with the {Dishcarge meds reviewed BFUI:46811} and appropriate educational materials and side effects teaching were provided.   ___________________________________________________________________________________________________________________________________

## 2021-04-02 NOTE — PERIOP NOTES
Handoff Report from Operating Room to PACU    Report received from Lito Javier  and Merline Bonito CRNA regarding Zainab Jackson. Surgeon(s):  Estee Maldonado MD  And Procedure(s) (LRB):  MESENTERIC ARTERIOGRAM, ANGIOPLASTY AND STENTING VIA LEFT BRACHIAL CUTDOWN (Left)  confirmed   with dressings discussed. Anesthesia type, drugs, patient history, complications, estimated blood loss, vital signs, intake and output, and last pain medication were reviewed.

## 2021-04-02 NOTE — PERIOP NOTES
covid test negative wears mask in public. Pt  Reports no s/s covid virus nor has she been around anyone with the covid virus. sis completed. hemocue at   13.4 dr. Coreen Acosta aware. No other labwork wanted or needed per him.

## 2021-04-02 NOTE — BRIEF OP NOTE
Brief Postoperative Note    Patient: Marvin Maher  YOB: 1960  MRN: [id number]    Date of Procedure: 4/2/2021     Pre-Op Diagnosis: MESENTERIC ISCHEMIA    Post-Op Diagnosis: Same as preoperative diagnosis. Procedure(s): MESENTERIC ARTERIOGRAM, ANGIOPLASTY AND STENTING OF SMA VIA LEFT BRACHIAL CUTDOWN    Surgeon(s):  Daniel Boateng MD    Surgical Assistant: Surg Asst-1: Carlos Corea    Anesthesia: MAC     Estimated Blood Loss (mL): 225 ml     Complications: None    Specimens: * No specimens in log *     Implants:   Implant Name Type Inv. Item Serial No.  Lot No. LRB No. Used Action   STENT BILI RENAL MR [id number] -- [place] [id number] - SN/A  STENT BILI RENAL MR [id number] -- [place] [id number] N/A [employer]WD [id number] N/A 1 Implanted       Drains: * No LDAs found *    Findings: Lt brachial cutdown. Severe SMA stenosis. PTA w/ 3x4. Stented w/ 5x19 [employer] w/ good result.     Electronically Signed by Santana Monzon MD on 4/2/2021 at 1:18 PM

## 2021-04-02 NOTE — ANESTHESIA PREPROCEDURE EVALUATION
Anesthetic History   No history of anesthetic complications            Review of Systems / Medical History  Patient summary reviewed, nursing notes reviewed and pertinent labs reviewed    Pulmonary          Smoker      Comments: Former smoker   Neuro/Psych         Psychiatric history    Comments: Anxiety  Depression  Sciatica   Peripheral neuropathy   Cardiovascular    Hypertension: well controlled      CHF  Dysrhythmias : atrial fibrillation  Pacemaker, CAD, PAD, CABG (2010) and hyperlipidemia    Exercise tolerance: >4 METS  Comments: Pacemaker  ECHO 2018 EF=60%     GI/Hepatic/Renal     GERD: well controlled          Comments: Anand's Esophagus Endo/Other      Hypothyroidism  Obesity and arthritis     Other Findings   Comments: MESENTERIC ISCHEMIA  Rt thyroid nodule stable              Physical Exam    Airway  Mallampati: III  TM Distance: < 4 cm  Neck ROM: normal range of motion   Mouth opening: Normal     Cardiovascular  Regular rate and rhythm,  S1 and S2 normal,  no murmur, click, rub, or gallop             Dental  No notable dental hx       Pulmonary  Breath sounds clear to auscultation               Abdominal  GI exam deferred       Other Findings            Anesthetic Plan    ASA: 3  Anesthesia type: MAC          Induction: Intravenous  Anesthetic plan and risks discussed with: Patient

## 2021-04-02 NOTE — PERIOP NOTES
TRANSFER - OUT REPORT:    Verbal report given to Anai Briseno RN (name) on Rachel Miller  being transferred to phase2(unit) for routine post - op       Report consisted of patients Situation, Background, Assessment and   Recommendations(SBAR). Information from the following report(s) SBAR, Kardex, OR Summary, Intake/Output and MAR was reviewed with the receiving nurse. Opportunity for questions and clarification was provided.       Patient transported with:   Registered Nurse

## 2021-04-03 ENCOUNTER — PATIENT OUTREACH (OUTPATIENT)
Dept: OTHER | Age: 61
End: 2021-04-03

## 2021-04-03 NOTE — LETTER
4/3/2021 10:35 AM    Ms. Jonathan Jose  [address] [facility] [address]          Welcome to Associate Care Management     Congratulations for taking a step towards improving your health by participating in the [facility] (ACM) program. ACM is a new model of care designed to improve the coordination of your health care with an emphasis on your overall well-being. This confidential program is offered free of charge to [facility] participants and their covered family members. To make the most of your first ACM call, please have the following items ready to discuss:     Make a list of any questions you have about your health including questions about dietary recommendations, lifestyle, and disease management.  Inform the ACM of any health care providers you have visited in the last month and the reason for your visit. This includes urgent care or ED visits.  Have a list of all prescribed medications including, over-the counter, herbal and dietary supplements. Inform ACM of any refills needed.  Inform ACM if any new problems have developed in the last month.  Confirm the date of your next ACM call.  Activate a [employer] account, if you haven't already. [employer] can provide a communication between you and your care team; as well as a chance to view your care plan.  If you want to designate a caregiver have access to your record, please ask your doctor's office for the form.  Think about your goals to achieve better health. With continued partnership through [facility], we hope to optimize your health, increase your quality of life, and prevent hospitalization. We look forward to continuing to serve you. If you have any health concerns prior to your next [facility] outreach, please contact your primary care doctor. Your ACM contact information is listed below for non-urgent questions.     Name: Alek Logan RN, Associate Miranda Manager  Contact Info: [phone]

## 2021-04-03 NOTE — PROGRESS NOTES
[facility] progress note    Patient eligible for [address]94 care management    Received notification of discharge from [facility] on  for Angioplasty. Contacted patient to discuss post discharge needs and offer care management services. Two patient identifiers verified. Discussed the care management program.  Patient agrees to care management services at this time. PMH:   Past Medical History:   Diagnosis Date    Arrhythmia     hx A-fib    Arthritis     CAD (coronary artery disease)     CABG     GERD (gastroesophageal reflux disease)     Heart failure (HCC)     Hypertension     Other ill-defined conditions(799.89)     epidural injections    Pacemaker 2018    dual chamber    Psychiatric disorder     anxiety/depression, years ago per patient    PVD (peripheral vascular disease) ([facility] [address]     left iliac stent    Right thyroid nodule 2014    stable       Social History:   Social History     Socioeconomic History    Marital status:      Spouse name: Not on file    Number of children: Not on file    Years of education: Not on file    Highest education level: Not on file   Occupational History    Occupation: coding     Employer: [employer]   Social Needs    Financial resource strain: Not on file    Food insecurity     Worry: Not on file     Inability: Not on file   [employer] needs     Medical: Not on file     Non-medical: Not on file   Tobacco Use    Smoking status: Former Smoker     Packs/day: 1.00     Years: 37.00     Pack years: 37.00     Quit date: 2010     Years since quittin.2    Smokeless tobacco: Never Used   Substance and Sexual Activity    Alcohol use:  Yes     Alcohol/week: 1.0 standard drinks     Types: 1 Shots of liquor per week    Drug use: Yes     Types: Prescription, OTC    Sexual activity: Never   Lifestyle    Physical activity     Days per week: Not on file     Minutes per session: Not on file    Stress: Not on file   Relationships    Social connections     Talks on phone: Not on file     Gets together: Not on file     Attends Buddhism service: Not on file     Active member of club or organization: Not on file     Attends meetings of clubs or organizations: Not on file     Relationship status: Not on file    Intimate partner violence     Fear of current or ex partner: Not on file     Emotionally abused: Not on file     Physically abused: Not on file     Forced sexual activity: Not on file   Other Topics Concern    Not on file   Social History Narrative    Lives in [address] [place] [address],[address] with  and 6 yo and 9 yo granddaughters. Has 1 son and 2 daughters. Works for [employer] about [employer]. Likes to [employer] and [employer]hawn and read and do puzzles. Patient's primary care provider relationship reviewed with patient and modified, as applicable. Care management assessment completed:    Condition Focused Assessment:   Surgical/Wound Condition Focused Assessment    Skin- any open wounds or incisions? yes  Description and location of wound- looks good  In the last 24 hour have you experienced; Fever no    Low body temperature no    Chills or shaking no    Sweating no    Fast heart rate no    Fast breathing no    Dizziness/lightheadedness no    Confusion or unusual change in mental status no    Diarrhea no    Nausea no    Vomiting no    Shortness of breath or difficulty breathing no    Less urine output no    Cold, clammy, and pale skin no     Skin rash or skin color changes no  New or worsening pain? no  If yes, pain rated 0-10: 2-3 Location/pain characteristics: n/a   New or worsening numbness or tingling? no  If yes, location of numbness and tingling: n/a    Patient reported important numbers to know:  Temperature 97.7   Heart rate 60   Last /51   Weight 153 lbs     Activity level- moving several times a day, or as recommended? yes  Abnormal activity level reported: n/a   Bathing and showering instructions?  yes  Nutrition- prescribed diet? no   Tolerating food? yes  Hydration- (how much in ounces per day) 64 ounces/day  Medications- new antibiotic? no             Pain medication? yes  Does patient understand how and when to take their medications? yes  If no, instructed patient on proper medication use? n/a  Does patient have incentive spirometer? no  If yes, how frequently is patient using incentive spirometer? n/a    Red Flags:  Report the following to your surgeon:  Excessive pain, swelling, redness or odor of or around the surgical area  Temperature over 100.5  Nausea and vomiting lasting longer than 4 hours or if unable to take medications  Any signs of decreased circulation or nerve impairment to extremity: change in color, persistent numbness, tingling,  coldness or increase pain  Any questions    Medications:  New Medications at Discharge: Plavix 75 mg daily; Norco 1 tablet as needed for pain (took 2 yesterday, has not taken today)  Changed Medications at Discharge: none  Discontinued Medications at Discharge: none  Current Outpatient Medications   Medication Sig    HYDROcodone-acetaminophen (Norco) 5-325 mg per tablet Take 1 Tab by mouth every four (4) hours as needed for Pain for up to 3 days. Max Daily Amount: 6 Tabs.  clopidogreL (Plavix) 75 mg tab Take 1 Tab by mouth daily.  lisinopril (PRINIVIL, ZESTRIL) 10 mg tablet Take 10 mg by mouth daily.  CALCIUM CARBONATE (CALCIUM 600 PO) Take 1,200 mg by mouth daily.  cholecalciferol (VITAMIN D3) 1,000 unit tablet Take 1,000 Units by mouth daily.  melatonin 3 mg tablet Take 1.5 mg by mouth nightly. 1/2 tablet qhs     ACETAMINOPHEN/DIPHENHYDRAMINE (TYLENOL PM PO) Take 1 Tab by mouth nightly as needed.  isosorbide mononitrate ER (IMDUR) 30 mg tablet Take 15 mg by mouth daily.  atorvastatin (LIPITOR) 80 mg tablet Take 80 mg by mouth nightly.  metoprolol-XL (TOPROL XL) 50 mg XL tablet Take 50 mg by mouth nightly.     aspirin (ASPIRIN) 325 mg tablet Take 325 mg by mouth daily. No current facility-administered medications for this visit. There are no discontinued medications. Performed medication reconciliation with patient, and patient verbalizes understanding of administration of home medications. There were no barriers to obtaining medications identified at this time. Preventive Care     Health Maintenance   Topic Date Due    Hepatitis C Screening  Never done    COVID-19 Vaccine (1) Never done    DTaP/Tdap/Td series (1 - Tdap) Never done    PAP AKA CERVICAL CYTOLOGY  Never done    Shingrix Vaccine Age 50> (1 of 2) Never done    Colorectal Cancer Screening Combo  Never done    Pneumococcal 0-64 years (1 of 1 - PPSV23) Never done    Lipid Screen  10/30/2011    Breast Cancer Screen Mammogram  03/01/2021    Flu Vaccine (Season Ended) 09/01/2021    Bone Densitometry (Dexa) Screening  Completed     Healthy Habits    Nutrition: Regular Diet    CM Identified  Problems   1. Potential Knowledge Deficit    Barriers/Support system:  patient, daughter and   Has pacemaker - monitor  Barriers/Challenges to Care: []  Decline in memory    []  Language barrier     []  Emotional                  []  Limited mobility  []  Lack of motivation     [] Vision, hearing or cognitive impairment []  Knowledge [] Financial Barriers []  Lack of support  []  Pain []  Other [x]  None    PCP/Specialist follow up: Patient scheduled to follow up with Dr. Catherine Cervantes - plans to call (needs to be 2-4 weeks). Reviewed red flags with patient, and patient verbalizes understanding. Patient given an opportunity to ask questions. No other clinical/social/functional needs noted. The patient agrees to contact the PCP office for questions related to their healthcare. The patient expressed thanks, offered no additional questions and ended the call.       Plan for next call: Call in one week, 4/9

## 2021-04-09 ENCOUNTER — PATIENT OUTREACH (OUTPATIENT)
Dept: OTHER | Age: 61
End: 2021-04-09

## 2021-04-09 NOTE — OP NOTES
[address]  OPERATIVE REPORT    Name:  Kadie Regan  MR#:  [id number]  :  1960  ACCOUNT #:  [de-identified]  DATE OF SERVICE:  2021    PREOPERATIVE DIAGNOSIS:  Chronic mesenteric ischemia. POSTOPERATIVE DIAGNOSIS:  Chronic mesenteric ischemia. PROCEDURE PERFORMED:  1. Left brachial artery cutdown. 2.  Abdominal aortogram.  3.  Selective superior mesenteric arteriogram.  4.  Angioplasty and stenting of superior mesenteric artery. SURGEON:  Yasmany Juarez MD    ASSISTANT:  None. ANESTHESIA:  MAC.    COMPLICATIONS:  None. SPECIMENS REMOVED:  None. IMPLANTS:  5 x 19 [place] [employer] Express stent. ESTIMATED BLOOD LOSS:  100 mL. DRAINS:  None. INDICATIONS:  The patient is a 27-year-old female with chronic mesenteric ischemia. Noninvasive imaging suggests significant superior mesenteric artery stenosis. The patient has not had a prior diagnostic arteriogram.  She presents for angiogram of the superior mesenteric artery with possible intervention. PROCEDURE:  After informed consent was obtained, the patient was given preoperative intravenous antibiotics within 1 hour of the incision. She was taken to the operating room and sedated. The left arm was prepped and draped as a sterile field. Under local anesthesia, a small incision was made just above the antecubital crease and the brachial artery was isolated and encircled proximally and distally with vessel loops. The patient was systemically heparinized. The artery was cannulated with a micropuncture needle and a 6-Arabic sheath was placed. A Glidewire and Rim catheter were used to navigate to the abdominal aorta, where a flush catheter was positioned. A lateral abdominal aortogram was performed. I was able to visualize the origin of the SMA, which appeared severely diseased, but further anatomic detail could not be determined.   A long 6-Arabic sheath was placed with its tip just above the origin of the SMA. The SMA was then selectively catheterized with a 5-Martiniquais catheter and a selective arteriogram was done. This showed that the SMA was diffusely diseased and rather small throughout, but there was critical stenosis in the proximal segment, which seemed to be 90% or more. I was able to cross this with a 0.014 wire and the SMA was angioplastied with a 3.0 x 4 balloon. Next the proximal SMA stenosis was stented with a 5 x 19 balloon expandable bare metal Express stent manufactured by [employer]. The stent was deployed nicely to profile and a completion angiogram demonstrated excellent position of the stent with no residual stenosis. The wires and catheters were removed and a small brachial artery cannulation site was closed with 6-0 Prolene suture. There was good Doppler flow in the brachial artery upon completion. The small incision was closed with 3-0 Vicryl suture and 4-0 Monocryl subcuticular suture and Dermabond was applied as a dressing. The patient was transferred to the PACU in stable condition. All counts were correct.         Garth Todd MD      [id number]  D:  04/09/2021 5:41  T:  04/09/2021 6:59  JOB #:  [id number]

## 2021-04-16 ENCOUNTER — PATIENT OUTREACH (OUTPATIENT)
Dept: OTHER | Age: 61
End: 2021-04-16

## 2021-04-16 NOTE — PROGRESS NOTES
Attempt to reach patient for follow up. Discreet VM left with contact information. Will try back in two weeks, 4/30.

## 2021-05-03 ENCOUNTER — PATIENT OUTREACH (OUTPATIENT)
Dept: OTHER | Age: 61
End: 2021-05-03

## 2021-05-03 NOTE — PROGRESS NOTES
Attempt to reach patient for follow up. Unable to leave VM, line, bad connection. To send lost to follow up letter.

## 2021-05-03 NOTE — LETTER
5/3/2021 11:25 AM    Ms. Malcolm Felder  [address]          Dear Ms. Monserrat Tiki,     I have been trying to reach you for a follow up call for services with our [facility] Program. Your wellbeing is very important to us. With continued partnership in the [facility] program, we hope to work with you to optimize your health and increase your quality of life. I look forward to continuing to work with you. Please contact me at your convenience and we can schedule a time that works best for you. Sincerely,    Jen Mace RN, [address] [place] [address] [phone]  F [phone]  [email or web address]  [facility] email: [email or web address]

## 2021-05-10 ENCOUNTER — PATIENT OUTREACH (OUTPATIENT)
Dept: OTHER | Age: 61
End: 2021-05-10

## 2021-07-30 DIAGNOSIS — J32.9 SINUSITIS, UNSPECIFIED CHRONICITY, UNSPECIFIED LOCATION: Primary | ICD-10-CM

## 2021-07-30 RX ORDER — AZITHROMYCIN 250 MG/1
TABLET, FILM COATED ORAL
Qty: 6 TABLET | Refills: 0 | Status: SHIPPED | OUTPATIENT
Start: 2021-07-30 | End: 2022-02-25

## 2021-09-08 LAB
CHOLEST SERPL-MCNC: 153 MG/DL
GLUCOSE SERPL-MCNC: 89 MG/DL (ref 65–100)
HDLC SERPL-MCNC: 62 MG/DL
LDLC SERPL CALC-MCNC: 66.2 MG/DL (ref 0–100)
TRIGL SERPL-MCNC: 124 MG/DL (ref ?–150)

## 2022-01-26 ENCOUNTER — TRANSCRIBE ORDER (OUTPATIENT)
Dept: SCHEDULING | Age: 62
End: 2022-01-26

## 2022-01-26 DIAGNOSIS — K55.9 VASCULAR INSUFFICIENCY OF INTESTINE (HCC): Primary | ICD-10-CM

## 2022-02-03 ENCOUNTER — HOSPITAL ENCOUNTER (OUTPATIENT)
Dept: CT IMAGING | Age: 62
Discharge: HOME | End: 2022-02-03
Attending: NURSE PRACTITIONER
Payer: COMMERCIAL

## 2022-02-03 DIAGNOSIS — K55.9 VASCULAR INSUFFICIENCY OF INTESTINE (HCC): ICD-10-CM

## 2022-02-03 LAB — CREAT BLD-MCNC: 1.2 MG/DL (ref 0.6–1.3)

## 2022-02-03 PROCEDURE — 74174 CTA ABD&PLVS W/CONTRAST: CPT

## 2022-02-03 PROCEDURE — 82565 ASSAY OF CREATININE: CPT

## 2022-02-03 PROCEDURE — 74011000636 HC RX REV CODE- 636: Performed by: NURSE PRACTITIONER

## 2022-02-03 RX ADMIN — IOPAMIDOL 100 ML: 755 INJECTION, SOLUTION INTRAVENOUS at 13:43

## 2022-02-25 ENCOUNTER — HOSPITAL ENCOUNTER (OUTPATIENT)
Dept: PREADMISSION TESTING | Age: 62
Discharge: HOME | End: 2022-02-25
Attending: SURGERY
Payer: COMMERCIAL

## 2022-02-25 VITALS
BODY MASS INDEX: 33.42 KG/M2 | OXYGEN SATURATION: 100 % | RESPIRATION RATE: 20 BRPM | SYSTOLIC BLOOD PRESSURE: 139 MMHG | DIASTOLIC BLOOD PRESSURE: 64 MMHG | HEART RATE: 59 BPM | HEIGHT: 59 IN | WEIGHT: 165.79 LBS | TEMPERATURE: 97 F

## 2022-02-25 LAB
ABO + RH BLD: NORMAL
ALBUMIN SERPL-MCNC: 3.5 G/DL (ref 3.5–5)
ALBUMIN/GLOB SERPL: 1.1 {RATIO} (ref 1.1–2.2)
ALP SERPL-CCNC: 81 U/L (ref 45–117)
ALT SERPL-CCNC: 20 U/L (ref 12–78)
ANION GAP SERPL CALC-SCNC: 1 MMOL/L (ref 5–15)
APPEARANCE UR: CLEAR
APTT PPP: 24.5 SEC (ref 22.1–31)
AST SERPL-CCNC: 9 U/L (ref 15–37)
ATRIAL RATE: 60 BPM
BACTERIA URNS QL MICRO: NEGATIVE /HPF
BILIRUB SERPL-MCNC: 0.7 MG/DL (ref 0.2–1)
BILIRUB UR QL: NEGATIVE
BLOOD GROUP ANTIBODIES SERPL: NORMAL
BUN SERPL-MCNC: 26 MG/DL (ref 6–20)
BUN/CREAT SERPL: 19 (ref 12–20)
CALCIUM SERPL-MCNC: 9.7 MG/DL (ref 8.5–10.1)
CALCULATED P AXIS, ECG09: 62 DEGREES
CALCULATED R AXIS, ECG10: 45 DEGREES
CALCULATED T AXIS, ECG11: -60 DEGREES
CHLORIDE SERPL-SCNC: 108 MMOL/L (ref 97–108)
CO2 SERPL-SCNC: 30 MMOL/L (ref 21–32)
COLOR UR: NORMAL
CREAT SERPL-MCNC: 1.34 MG/DL (ref 0.55–1.02)
DIAGNOSIS, 93000: NORMAL
EPITH CASTS URNS QL MICRO: NORMAL /LPF
ERYTHROCYTE [DISTWIDTH] IN BLOOD BY AUTOMATED COUNT: 12.7 % (ref 11.5–14.5)
GLOBULIN SER CALC-MCNC: 3.3 G/DL (ref 2–4)
GLUCOSE SERPL-MCNC: 77 MG/DL (ref 65–100)
GLUCOSE UR STRIP.AUTO-MCNC: NEGATIVE MG/DL
HCT VFR BLD AUTO: 42.2 % (ref 35–47)
HGB BLD-MCNC: 13.9 G/DL (ref 11.5–16)
HGB UR QL STRIP: NEGATIVE
HYALINE CASTS URNS QL MICRO: NORMAL /LPF (ref 0–5)
INR PPP: 1 (ref 0.9–1.1)
KETONES UR QL STRIP.AUTO: NEGATIVE MG/DL
LEUKOCYTE ESTERASE UR QL STRIP.AUTO: NEGATIVE
MCH RBC QN AUTO: 31.7 PG (ref 26–34)
MCHC RBC AUTO-ENTMCNC: 32.9 G/DL (ref 30–36.5)
MCV RBC AUTO: 96.1 FL (ref 80–99)
NITRITE UR QL STRIP.AUTO: NEGATIVE
NRBC # BLD: 0 K/UL (ref 0–0.01)
NRBC BLD-RTO: 0 PER 100 WBC
P-R INTERVAL, ECG05: 100 MS
PH UR STRIP: 6.5 [PH] (ref 5–8)
PLATELET # BLD AUTO: 203 K/UL (ref 150–400)
PMV BLD AUTO: 8.6 FL (ref 8.9–12.9)
POTASSIUM SERPL-SCNC: 4.8 MMOL/L (ref 3.5–5.1)
PROT SERPL-MCNC: 6.8 G/DL (ref 6.4–8.2)
PROT UR STRIP-MCNC: NEGATIVE MG/DL
PROTHROMBIN TIME: 10.1 SEC (ref 9–11.1)
Q-T INTERVAL, ECG07: 420 MS
QRS DURATION, ECG06: 84 MS
QTC CALCULATION (BEZET), ECG08: 420 MS
RBC # BLD AUTO: 4.39 M/UL (ref 3.8–5.2)
RBC #/AREA URNS HPF: NORMAL /HPF (ref 0–5)
SODIUM SERPL-SCNC: 139 MMOL/L (ref 136–145)
SP GR UR REFRACTOMETRY: 1.01 (ref 1–1.03)
SPECIMEN EXP DATE BLD: NORMAL
THERAPEUTIC RANGE,PTTT: NORMAL SECS (ref 58–77)
UA: UC IF INDICATED,UAUC: NORMAL
UROBILINOGEN UR QL STRIP.AUTO: 0.2 EU/DL (ref 0.2–1)
VENTRICULAR RATE, ECG03: 60 BPM
WBC # BLD AUTO: 10.4 K/UL (ref 3.6–11)
WBC URNS QL MICRO: NORMAL /HPF (ref 0–4)

## 2022-02-25 PROCEDURE — 85610 PROTHROMBIN TIME: CPT

## 2022-02-25 PROCEDURE — 85027 COMPLETE CBC AUTOMATED: CPT

## 2022-02-25 PROCEDURE — 93005 ELECTROCARDIOGRAM TRACING: CPT

## 2022-02-25 PROCEDURE — 81001 URINALYSIS AUTO W/SCOPE: CPT

## 2022-02-25 PROCEDURE — 86900 BLOOD TYPING SEROLOGIC ABO: CPT

## 2022-02-25 PROCEDURE — 80053 COMPREHEN METABOLIC PANEL: CPT

## 2022-02-25 PROCEDURE — 85730 THROMBOPLASTIN TIME PARTIAL: CPT

## 2022-02-25 RX ORDER — CEFAZOLIN SODIUM 1 G/3ML
2 INJECTION, POWDER, FOR SOLUTION INTRAMUSCULAR; INTRAVENOUS ONCE
Status: CANCELLED | OUTPATIENT
Start: 2022-03-04 | End: 2022-03-04

## 2022-02-25 RX ORDER — SODIUM CHLORIDE, SODIUM LACTATE, POTASSIUM CHLORIDE, CALCIUM CHLORIDE 600; 310; 30; 20 MG/100ML; MG/100ML; MG/100ML; MG/100ML
25 INJECTION, SOLUTION INTRAVENOUS CONTINUOUS
Status: CANCELLED | OUTPATIENT
Start: 2022-03-04

## 2022-02-25 RX ORDER — DICLOFENAC SODIUM 50 MG/1
50 TABLET, DELAYED RELEASE ORAL 2 TIMES DAILY
COMMUNITY

## 2022-02-25 NOTE — PERIOP NOTES
[facility]  Preoperative Instructions    COVID TEST-MOB 1-[place] side  02/28/22  Anytime between 7 am and 1:45 am  Surgery Date 03/04/22        Time of Arrival --to be called with time  Contact # [phone] cell  1. On the day of your surgery, please report to the Surgical Services Registration Desk and sign in at your designated time. The Surgery Center is located to the right of the Emergency Room. 2. You must have someone with you to drive you home. You should not drive a car for 24 hours following surgery. Please make arrangements for a friend or family member to stay with you for the first 24 hours after your surgery. 3. Do not have anything to eat or drink (including water, gum, mints, coffee, juice) after midnight ?03/03/22  . ? This may not apply to medications prescribed by your physician. ?(Please note below the special instructions with medications to take the morning of your procedure.)    4. We recommend you do not drink any alcoholic beverages for 24 hours before and after your surgery. 5. Contact your surgeons office for instructions on the following medications: non-steroidal anti-inflammatory drugs (i.e. Advil, Aleve), vitamins, and supplements. (Some surgeons will want you to stop these medications prior to surgery and others may allow you to take them)  **If you are currently taking Plavix, Coumadin, Aspirin and/or other blood-thinning agents, contact your surgeon for instructions. ** Your surgeon will partner with the physician prescribing these medications to determine if it is safe to stop or if you need to continue taking. Please do not stop taking these medications without instructions from your surgeon    6. Wear comfortable clothes. Wear glasses instead of contacts. Do not bring any money or jewelry. Please bring picture ID, insurance card, and any prearranged co-payment or hospital payment.   Do not wear make-up, particularly mascara the morning of your surgery. Do not wear nail polish, particularly if you are having foot /hand surgery. Wear your hair loose or down, no ponytails, buns, rehana pins or clips. All body piercings must be removed. Please shower with antibacterial soap for three consecutive days before and on the morning of surgery, but do not apply any lotions, powders or deodorants after the shower on the day of surgery. Please use a fresh towels after each shower. Please sleep in clean clothes and change bed linens the night before surgery. Please do not shave for 48 hours prior to surgery. Shaving of the face is acceptable. 7. You should understand that if you do not follow these instructions your surgery may be cancelled. If your physical condition changes (I.e. fever, cold or flu) please contact your surgeon as soon as possible. 8. It is important that you be on time. If a situation occurs where you may be late, please call [phone] (OR Holding Area). 9. If you have any questions and or problems, please call [phone] (Pre-admission Testing). 10. Your surgery time may be subject to change. You will receive a phone call the evening prior if your time changes. 11.  If having outpatient surgery, you must have someone to drive you here, stay with you during the duration of your stay, and to drive you home at time of discharge. 12.  Due to current COVID restrictions, only ONE adult may accompany you the day of the procedure. We have limited seating available. If our waiting room is at capacity, your ride may be asked to remain in their vehicle. No children are allowed in the waiting room  Special Instructions: Follow surgeon instructions for aspirin--patient not taking plavix (office notified)  TAKE ALL MEDICATIONS DAY OF SURGERY EXCEPT:no restrictions      I understand a pre-operative phone call will be made to verify my surgery time.   In the event that I am not available, I give permission for a message to be left on my answering service and/or with another person?   yes          ___________________      __________   _________    (Signature of Patient)             (Witness)                (Date and Time)

## 2022-02-25 NOTE — PERIOP NOTES
Incentive Spirometer        Using the incentive spirometer helps expand the small air sacs of your lungs, helps you breathe deeply, and helps improve your lung function. Use your incentive spirometer twice a day (10 breaths each time) prior to surgery. How to Use Your Incentive Spirometer:  1. Hold the incentive spirometer in an upright position. 2. Breathe out as usual.   3. Place the mouthpiece in your mouth and seal your lips tightly around it. 4. Take a deep breath. Breathe in slowly and as deeply as possible. Keep the blue flow rate guide between the arrows. 5. Hold your breath as long as possible. Then exhale slowly and allow the piston to fall to the bottom of the column. 6. Rest for a few seconds and repeat steps one through five at least 10 times. PAT Tidal Volume______2000____________  x__2______________  Date_______02/25/22________________    Kina Ales THE INCENTIVE SPIROMETER WITH YOU TO THE HOSPITAL ON THE DAY OF YOUR SURGERY. Opportunity given to ask and answer questions as well as to observe return demonstration.     Patient signature_____________________________    Witness____________________________

## 2022-02-28 ENCOUNTER — HOSPITAL ENCOUNTER (OUTPATIENT)
Dept: PREADMISSION TESTING | Age: 62
Discharge: HOME | End: 2022-02-28
Payer: COMMERCIAL

## 2022-02-28 LAB
SARS-COV-2, XPLCVT: NOT DETECTED
SOURCE, COVRS: NORMAL

## 2022-02-28 PROCEDURE — U0005 INFEC AGEN DETEC AMPLI PROBE: HCPCS

## 2022-03-03 NOTE — PERIOP NOTES
Dr. Stephanie Mckenna office notified patient has not been on metoprolol for extended time. Not aware of prescription called in by cardiology 01/22 to mail order pharmacy. Cardiology to follow up and contact patient for medication instructions.

## 2022-03-03 NOTE — PERIOP NOTES
Follow up call to Dr. Sari Echeverria office to report has not been on plavix since last year after surgery (only took medication for 30 days). Telephone Encounter by Suzanne Barrow RN, BSN at 12/28/17 03:27 PM     Author:  Suzanne Barrow RN, BSN Service:  (none) Author Type:  Registered Nurse     Filed:  12/28/17 03:28 PM Encounter Date:  12/21/2017 Status:  Signed     :  Suzanne Barrow RN, BSN (Registered Nurse)            Order was electronically prescribed to [facility] on [address] yesterday. Paper copy faxed to [facility] at this time.[JH1.1M] Patient notified[JH1.1T] via [facility][JH1.1M]      Revision History        User Key Date/Time User Provider Type Action    > JH1.1 12/28/17 03:28 PM Suzanne Barrow RN, BSN Registered Nurse Sign    M - Manual, T - Template

## 2022-03-04 ENCOUNTER — APPOINTMENT (OUTPATIENT)
Dept: GENERAL RADIOLOGY | Age: 62
End: 2022-03-04
Attending: SURGERY
Payer: COMMERCIAL

## 2022-03-04 ENCOUNTER — ANESTHESIA (OUTPATIENT)
Dept: SURGERY | Age: 62
End: 2022-03-04
Payer: COMMERCIAL

## 2022-03-04 ENCOUNTER — ANESTHESIA EVENT (OUTPATIENT)
Dept: SURGERY | Age: 62
End: 2022-03-04
Payer: COMMERCIAL

## 2022-03-04 ENCOUNTER — HOSPITAL ENCOUNTER (OUTPATIENT)
Age: 62
Setting detail: OBSERVATION
Discharge: HOME | End: 2022-03-06
Attending: SURGERY | Admitting: SURGERY
Payer: COMMERCIAL

## 2022-03-04 PROBLEM — K55.1 CHRONIC MESENTERIC ISCHEMIA (HCC): Status: ACTIVE | Noted: 2022-03-04

## 2022-03-04 PROCEDURE — 74011250636 HC RX REV CODE- 250/636: Performed by: NURSE ANESTHETIST, CERTIFIED REGISTERED

## 2022-03-04 PROCEDURE — C1894 INTRO/SHEATH, NON-LASER: HCPCS | Performed by: SURGERY

## 2022-03-04 PROCEDURE — C1769 GUIDE WIRE: HCPCS | Performed by: SURGERY

## 2022-03-04 PROCEDURE — 74011250637 HC RX REV CODE- 250/637: Performed by: SURGERY

## 2022-03-04 PROCEDURE — 74011250636 HC RX REV CODE- 250/636: Performed by: ANESTHESIOLOGY

## 2022-03-04 PROCEDURE — G0378 HOSPITAL OBSERVATION PER HR: HCPCS

## 2022-03-04 PROCEDURE — C1874 STENT, COATED/COV W/DEL SYS: HCPCS | Performed by: SURGERY

## 2022-03-04 PROCEDURE — C1887 CATHETER, GUIDING: HCPCS | Performed by: SURGERY

## 2022-03-04 PROCEDURE — C1753 CATH, INTRAVAS ULTRASOUND: HCPCS | Performed by: SURGERY

## 2022-03-04 PROCEDURE — 76000 FLUOROSCOPY <1 HR PHYS/QHP: CPT

## 2022-03-04 PROCEDURE — 74011250636 HC RX REV CODE- 250/636: Performed by: SURGERY

## 2022-03-04 PROCEDURE — 76010000162 HC OR TIME 1.5 TO 2 HR INTENSV-TIER 1: Performed by: SURGERY

## 2022-03-04 PROCEDURE — 77030013058 HC DEV INFL ANGIO BSC -B: Performed by: SURGERY

## 2022-03-04 PROCEDURE — 2709999900 HC NON-CHARGEABLE SUPPLY: Performed by: SURGERY

## 2022-03-04 PROCEDURE — 77030004561 HC CATH ANGI DX COBRA ANGI -B: Performed by: SURGERY

## 2022-03-04 PROCEDURE — 74011000250 HC RX REV CODE- 250: Performed by: ANESTHESIOLOGY

## 2022-03-04 PROCEDURE — 74011000250 HC RX REV CODE- 250: Performed by: NURSE ANESTHETIST, CERTIFIED REGISTERED

## 2022-03-04 PROCEDURE — 74011000250 HC RX REV CODE- 250: Performed by: SURGERY

## 2022-03-04 PROCEDURE — 2709999900 HC NON-CHARGEABLE SUPPLY

## 2022-03-04 PROCEDURE — C1876 STENT, NON-COA/NON-COV W/DEL: HCPCS | Performed by: SURGERY

## 2022-03-04 PROCEDURE — 74011000636 HC RX REV CODE- 636: Performed by: SURGERY

## 2022-03-04 PROCEDURE — 72190 X-RAY EXAM OF PELVIS: CPT

## 2022-03-04 PROCEDURE — 76210000003 HC OR PH I REC 3.5 TO 4 HR: Performed by: SURGERY

## 2022-03-04 PROCEDURE — 76060000034 HC ANESTHESIA 1.5 TO 2 HR: Performed by: SURGERY

## 2022-03-04 PROCEDURE — C1760 CLOSURE DEV, VASC: HCPCS | Performed by: SURGERY

## 2022-03-04 DEVICE — VIABAHN BX BALLOON EXP ENDO 7MMX39MM 7FR 135CMCATH HEPARIN
Type: IMPLANTABLE DEVICE | Site: COMMON ILIAC | Status: FUNCTIONAL
Brand: GORE VIABAHN VBX BALLOON EXPANDABLE ENDO

## 2022-03-04 DEVICE — PREMOUNTED STENT SYSTEM
Type: IMPLANTABLE DEVICE | Site: MESENTERIC | Status: FUNCTIONAL
Brand: EXPRESS® SD RENAL/BILIARY

## 2022-03-04 DEVICE — VIABAHN BX BALLOON EXP ENDO 6MMX15MM 7FR 135CMCATH HEPARIN
Type: IMPLANTABLE DEVICE | Site: MESENTERIC | Status: FUNCTIONAL
Brand: GORE VIABAHN VBX BALLOON EXPANDABLE ENDO

## 2022-03-04 RX ORDER — CEFAZOLIN SODIUM 1 G/3ML
2 INJECTION, POWDER, FOR SOLUTION INTRAMUSCULAR; INTRAVENOUS ONCE
Status: DISCONTINUED | OUTPATIENT
Start: 2022-03-04 | End: 2022-03-04 | Stop reason: CLARIF

## 2022-03-04 RX ORDER — LISINOPRIL 5 MG/1
10 TABLET ORAL DAILY
Status: DISCONTINUED | OUTPATIENT
Start: 2022-03-05 | End: 2022-03-06 | Stop reason: HOSPADM

## 2022-03-04 RX ORDER — ONDANSETRON 2 MG/ML
4 INJECTION INTRAMUSCULAR; INTRAVENOUS AS NEEDED
Status: DISCONTINUED | OUTPATIENT
Start: 2022-03-04 | End: 2022-03-04 | Stop reason: HOSPADM

## 2022-03-04 RX ORDER — ONDANSETRON 2 MG/ML
INJECTION INTRAMUSCULAR; INTRAVENOUS AS NEEDED
Status: DISCONTINUED | OUTPATIENT
Start: 2022-03-04 | End: 2022-03-04 | Stop reason: HOSPADM

## 2022-03-04 RX ORDER — EPHEDRINE SULFATE/0.9% NACL/PF 50 MG/5 ML
10 SYRINGE (ML) INTRAVENOUS ONCE
Status: COMPLETED | OUTPATIENT
Start: 2022-03-04 | End: 2022-03-04

## 2022-03-04 RX ORDER — LIDOCAINE HYDROCHLORIDE 10 MG/ML
0.1 INJECTION, SOLUTION EPIDURAL; INFILTRATION; INTRACAUDAL; PERINEURAL AS NEEDED
Status: DISCONTINUED | OUTPATIENT
Start: 2022-03-04 | End: 2022-03-04 | Stop reason: HOSPADM

## 2022-03-04 RX ORDER — PROTAMINE SULFATE 10 MG/ML
INJECTION, SOLUTION INTRAVENOUS AS NEEDED
Status: DISCONTINUED | OUTPATIENT
Start: 2022-03-04 | End: 2022-03-04 | Stop reason: HOSPADM

## 2022-03-04 RX ORDER — EPHEDRINE SULFATE/0.9% NACL/PF 50 MG/5 ML
25 SYRINGE (ML) INTRAVENOUS ONCE
Status: COMPLETED | OUTPATIENT
Start: 2022-03-04 | End: 2022-03-04

## 2022-03-04 RX ORDER — MORPHINE SULFATE 2 MG/ML
2 INJECTION, SOLUTION INTRAMUSCULAR; INTRAVENOUS
Status: DISCONTINUED | OUTPATIENT
Start: 2022-03-04 | End: 2022-03-04 | Stop reason: HOSPADM

## 2022-03-04 RX ORDER — ASPIRIN 81 MG/1
81 TABLET ORAL DAILY
Status: DISCONTINUED | OUTPATIENT
Start: 2022-03-05 | End: 2022-03-06 | Stop reason: HOSPADM

## 2022-03-04 RX ORDER — PHENYLEPHRINE HCL IN 0.9% NACL 0.4MG/10ML
SYRINGE (ML) INTRAVENOUS
Status: DISCONTINUED | OUTPATIENT
Start: 2022-03-04 | End: 2022-03-04 | Stop reason: HOSPADM

## 2022-03-04 RX ORDER — FENTANYL CITRATE 50 UG/ML
25 INJECTION, SOLUTION INTRAMUSCULAR; INTRAVENOUS
Status: DISCONTINUED | OUTPATIENT
Start: 2022-03-04 | End: 2022-03-04 | Stop reason: HOSPADM

## 2022-03-04 RX ORDER — FENTANYL CITRATE 50 UG/ML
INJECTION, SOLUTION INTRAMUSCULAR; INTRAVENOUS AS NEEDED
Status: DISCONTINUED | OUTPATIENT
Start: 2022-03-04 | End: 2022-03-04 | Stop reason: HOSPADM

## 2022-03-04 RX ORDER — SODIUM CHLORIDE, SODIUM LACTATE, POTASSIUM CHLORIDE, CALCIUM CHLORIDE 600; 310; 30; 20 MG/100ML; MG/100ML; MG/100ML; MG/100ML
25 INJECTION, SOLUTION INTRAVENOUS CONTINUOUS
Status: DISCONTINUED | OUTPATIENT
Start: 2022-03-04 | End: 2022-03-04 | Stop reason: HOSPADM

## 2022-03-04 RX ORDER — ACETAMINOPHEN 325 MG/1
650 TABLET ORAL
Status: DISCONTINUED | OUTPATIENT
Start: 2022-03-04 | End: 2022-03-06 | Stop reason: HOSPADM

## 2022-03-04 RX ORDER — SODIUM CHLORIDE 9 MG/ML
75 INJECTION, SOLUTION INTRAVENOUS CONTINUOUS
Status: DISCONTINUED | OUTPATIENT
Start: 2022-03-04 | End: 2022-03-06 | Stop reason: HOSPADM

## 2022-03-04 RX ORDER — HYDROMORPHONE HYDROCHLORIDE 1 MG/ML
.2-.5 INJECTION, SOLUTION INTRAMUSCULAR; INTRAVENOUS; SUBCUTANEOUS
Status: DISCONTINUED | OUTPATIENT
Start: 2022-03-04 | End: 2022-03-04 | Stop reason: HOSPADM

## 2022-03-04 RX ORDER — ATORVASTATIN CALCIUM 40 MG/1
80 TABLET, FILM COATED ORAL
Status: DISCONTINUED | OUTPATIENT
Start: 2022-03-04 | End: 2022-03-06 | Stop reason: HOSPADM

## 2022-03-04 RX ORDER — EPHEDRINE SULFATE/0.9% NACL/PF 50 MG/5 ML
SYRINGE (ML) INTRAVENOUS AS NEEDED
Status: DISCONTINUED | OUTPATIENT
Start: 2022-03-04 | End: 2022-03-04 | Stop reason: HOSPADM

## 2022-03-04 RX ORDER — FACIAL-BODY WIPES
10 EACH TOPICAL DAILY PRN
Status: DISCONTINUED | OUTPATIENT
Start: 2022-03-04 | End: 2022-03-06 | Stop reason: HOSPADM

## 2022-03-04 RX ORDER — ISOSORBIDE MONONITRATE 30 MG/1
15 TABLET, EXTENDED RELEASE ORAL DAILY
Status: DISCONTINUED | OUTPATIENT
Start: 2022-03-05 | End: 2022-03-06 | Stop reason: HOSPADM

## 2022-03-04 RX ORDER — LIDOCAINE HYDROCHLORIDE 10 MG/ML
INJECTION INFILTRATION; PERINEURAL AS NEEDED
Status: DISCONTINUED | OUTPATIENT
Start: 2022-03-04 | End: 2022-03-04 | Stop reason: HOSPADM

## 2022-03-04 RX ORDER — FENTANYL CITRATE 50 UG/ML
50 INJECTION, SOLUTION INTRAMUSCULAR; INTRAVENOUS AS NEEDED
Status: DISCONTINUED | OUTPATIENT
Start: 2022-03-04 | End: 2022-03-04 | Stop reason: HOSPADM

## 2022-03-04 RX ORDER — PROPOFOL 10 MG/ML
INJECTION, EMULSION INTRAVENOUS AS NEEDED
Status: DISCONTINUED | OUTPATIENT
Start: 2022-03-04 | End: 2022-03-04 | Stop reason: HOSPADM

## 2022-03-04 RX ORDER — MIDAZOLAM HYDROCHLORIDE 1 MG/ML
INJECTION, SOLUTION INTRAMUSCULAR; INTRAVENOUS AS NEEDED
Status: DISCONTINUED | OUTPATIENT
Start: 2022-03-04 | End: 2022-03-04 | Stop reason: HOSPADM

## 2022-03-04 RX ORDER — OXYCODONE AND ACETAMINOPHEN 5; 325 MG/1; MG/1
1 TABLET ORAL
Status: DISCONTINUED | OUTPATIENT
Start: 2022-03-04 | End: 2022-03-06 | Stop reason: HOSPADM

## 2022-03-04 RX ORDER — LANOLIN ALCOHOL/MO/W.PET/CERES
1.5 CREAM (GRAM) TOPICAL
Status: DISCONTINUED | OUTPATIENT
Start: 2022-03-04 | End: 2022-03-06 | Stop reason: HOSPADM

## 2022-03-04 RX ORDER — GLYCOPYRROLATE 0.2 MG/ML
INJECTION INTRAMUSCULAR; INTRAVENOUS AS NEEDED
Status: DISCONTINUED | OUTPATIENT
Start: 2022-03-04 | End: 2022-03-04 | Stop reason: HOSPADM

## 2022-03-04 RX ORDER — SODIUM CHLORIDE 0.9 % (FLUSH) 0.9 %
5-40 SYRINGE (ML) INJECTION AS NEEDED
Status: DISCONTINUED | OUTPATIENT
Start: 2022-03-04 | End: 2022-03-04 | Stop reason: HOSPADM

## 2022-03-04 RX ORDER — MIDAZOLAM HYDROCHLORIDE 1 MG/ML
0.5 INJECTION, SOLUTION INTRAMUSCULAR; INTRAVENOUS
Status: DISCONTINUED | OUTPATIENT
Start: 2022-03-04 | End: 2022-03-04 | Stop reason: HOSPADM

## 2022-03-04 RX ORDER — ADHESIVE BANDAGE
30 BANDAGE TOPICAL DAILY PRN
Status: DISCONTINUED | OUTPATIENT
Start: 2022-03-04 | End: 2022-03-06 | Stop reason: HOSPADM

## 2022-03-04 RX ORDER — DIPHENHYDRAMINE HYDROCHLORIDE 50 MG/ML
12.5 INJECTION, SOLUTION INTRAMUSCULAR; INTRAVENOUS AS NEEDED
Status: DISCONTINUED | OUTPATIENT
Start: 2022-03-04 | End: 2022-03-04 | Stop reason: HOSPADM

## 2022-03-04 RX ORDER — SODIUM CHLORIDE 0.9 % (FLUSH) 0.9 %
5-40 SYRINGE (ML) INJECTION EVERY 8 HOURS
Status: DISCONTINUED | OUTPATIENT
Start: 2022-03-04 | End: 2022-03-04 | Stop reason: HOSPADM

## 2022-03-04 RX ORDER — LIDOCAINE HYDROCHLORIDE 20 MG/ML
INJECTION, SOLUTION EPIDURAL; INFILTRATION; INTRACAUDAL; PERINEURAL AS NEEDED
Status: DISCONTINUED | OUTPATIENT
Start: 2022-03-04 | End: 2022-03-04 | Stop reason: HOSPADM

## 2022-03-04 RX ORDER — ROCURONIUM BROMIDE 10 MG/ML
INJECTION, SOLUTION INTRAVENOUS AS NEEDED
Status: DISCONTINUED | OUTPATIENT
Start: 2022-03-04 | End: 2022-03-04 | Stop reason: HOSPADM

## 2022-03-04 RX ORDER — ONDANSETRON 2 MG/ML
4 INJECTION INTRAMUSCULAR; INTRAVENOUS
Status: DISCONTINUED | OUTPATIENT
Start: 2022-03-04 | End: 2022-03-06 | Stop reason: HOSPADM

## 2022-03-04 RX ORDER — HEPARIN SODIUM 1000 [USP'U]/ML
INJECTION, SOLUTION INTRAVENOUS; SUBCUTANEOUS AS NEEDED
Status: DISCONTINUED | OUTPATIENT
Start: 2022-03-04 | End: 2022-03-04 | Stop reason: HOSPADM

## 2022-03-04 RX ORDER — MORPHINE SULFATE 2 MG/ML
2 INJECTION, SOLUTION INTRAMUSCULAR; INTRAVENOUS
Status: DISCONTINUED | OUTPATIENT
Start: 2022-03-04 | End: 2022-03-06 | Stop reason: HOSPADM

## 2022-03-04 RX ORDER — PROPOFOL 10 MG/ML
INJECTION, EMULSION INTRAVENOUS
Status: DISCONTINUED | OUTPATIENT
Start: 2022-03-04 | End: 2022-03-04 | Stop reason: HOSPADM

## 2022-03-04 RX ORDER — NEOSTIGMINE METHYLSULFATE 5 MG/5 ML
SYRINGE (ML) INTRAVENOUS AS NEEDED
Status: DISCONTINUED | OUTPATIENT
Start: 2022-03-04 | End: 2022-03-04 | Stop reason: HOSPADM

## 2022-03-04 RX ORDER — KETAMINE HYDROCHLORIDE 100 MG/ML
INJECTION, SOLUTION INTRAMUSCULAR; INTRAVENOUS AS NEEDED
Status: DISCONTINUED | OUTPATIENT
Start: 2022-03-04 | End: 2022-03-04 | Stop reason: HOSPADM

## 2022-03-04 RX ADMIN — FENTANYL CITRATE 25 MCG: 50 INJECTION, SOLUTION INTRAMUSCULAR; INTRAVENOUS at 12:45

## 2022-03-04 RX ADMIN — Medication 10 MG: at 15:18

## 2022-03-04 RX ADMIN — SODIUM CHLORIDE, POTASSIUM CHLORIDE, SODIUM LACTATE AND CALCIUM CHLORIDE 500 ML: 600; 310; 30; 20 INJECTION, SOLUTION INTRAVENOUS at 14:20

## 2022-03-04 RX ADMIN — ROCURONIUM BROMIDE 30 MG: 10 INJECTION INTRAVENOUS at 13:04

## 2022-03-04 RX ADMIN — FENTANYL CITRATE 50 MCG: 50 INJECTION, SOLUTION INTRAMUSCULAR; INTRAVENOUS at 13:40

## 2022-03-04 RX ADMIN — WATER 2 G: 1 INJECTION INTRAMUSCULAR; INTRAVENOUS; SUBCUTANEOUS at 12:22

## 2022-03-04 RX ADMIN — LIDOCAINE HYDROCHLORIDE 80 MG: 20 INJECTION, SOLUTION EPIDURAL; INFILTRATION; INTRACAUDAL; PERINEURAL at 12:10

## 2022-03-04 RX ADMIN — VANCOMYCIN HYDROCHLORIDE 1000 MG: 1 INJECTION, POWDER, LYOPHILIZED, FOR SOLUTION INTRAVENOUS at 10:44

## 2022-03-04 RX ADMIN — KETAMINE HYDROCHLORIDE 5 MG: 100 INJECTION, SOLUTION, CONCENTRATE INTRAMUSCULAR; INTRAVENOUS at 12:22

## 2022-03-04 RX ADMIN — PROTAMINE SULFATE 40 MG: 10 INJECTION, SOLUTION INTRAVENOUS at 13:47

## 2022-03-04 RX ADMIN — Medication 10 MG: at 14:34

## 2022-03-04 RX ADMIN — Medication 10 MG: at 13:54

## 2022-03-04 RX ADMIN — Medication 60 MCG/MIN: at 12:14

## 2022-03-04 RX ADMIN — ONDANSETRON HYDROCHLORIDE 4 MG: 2 INJECTION, SOLUTION INTRAMUSCULAR; INTRAVENOUS at 12:27

## 2022-03-04 RX ADMIN — HEPARIN SODIUM 6000 UNITS: 1000 INJECTION, SOLUTION INTRAVENOUS; SUBCUTANEOUS at 12:33

## 2022-03-04 RX ADMIN — SODIUM CHLORIDE 75 ML/HR: 9 INJECTION, SOLUTION INTRAVENOUS at 14:42

## 2022-03-04 RX ADMIN — Medication 3 AMPULE: at 10:46

## 2022-03-04 RX ADMIN — KETAMINE HYDROCHLORIDE 5 MG: 100 INJECTION, SOLUTION, CONCENTRATE INTRAMUSCULAR; INTRAVENOUS at 12:39

## 2022-03-04 RX ADMIN — RIVAROXABAN 2.5 MG: 2.5 TABLET, FILM COATED ORAL at 22:06

## 2022-03-04 RX ADMIN — PROPOFOL 25 MG: 10 INJECTION, EMULSION INTRAVENOUS at 12:44

## 2022-03-04 RX ADMIN — MIDAZOLAM HYDROCHLORIDE 1 MG: 1 INJECTION, SOLUTION INTRAMUSCULAR; INTRAVENOUS at 12:17

## 2022-03-04 RX ADMIN — KETAMINE HYDROCHLORIDE 5 MG: 100 INJECTION, SOLUTION, CONCENTRATE INTRAMUSCULAR; INTRAVENOUS at 12:44

## 2022-03-04 RX ADMIN — ATORVASTATIN CALCIUM 80 MG: 40 TABLET, FILM COATED ORAL at 22:06

## 2022-03-04 RX ADMIN — MELATONIN 1.5 MG: at 22:06

## 2022-03-04 RX ADMIN — FENTANYL CITRATE 25 MCG: 50 INJECTION, SOLUTION INTRAMUSCULAR; INTRAVENOUS at 12:24

## 2022-03-04 RX ADMIN — MIDAZOLAM HYDROCHLORIDE 1 MG: 1 INJECTION, SOLUTION INTRAMUSCULAR; INTRAVENOUS at 12:07

## 2022-03-04 RX ADMIN — KETAMINE HYDROCHLORIDE 5 MG: 100 INJECTION, SOLUTION, CONCENTRATE INTRAMUSCULAR; INTRAVENOUS at 12:30

## 2022-03-04 RX ADMIN — GLYCOPYRROLATE 0.4 MG: 0.2 INJECTION, SOLUTION INTRAMUSCULAR; INTRAVENOUS at 13:56

## 2022-03-04 RX ADMIN — Medication 3 MG: at 13:56

## 2022-03-04 RX ADMIN — PROPOFOL 75 MCG/KG/MIN: 10 INJECTION, EMULSION INTRAVENOUS at 12:08

## 2022-03-04 RX ADMIN — FENTANYL CITRATE 25 MCG: 50 INJECTION, SOLUTION INTRAMUSCULAR; INTRAVENOUS at 13:05

## 2022-03-04 RX ADMIN — KETAMINE HYDROCHLORIDE 5 MG: 100 INJECTION, SOLUTION, CONCENTRATE INTRAMUSCULAR; INTRAVENOUS at 12:27

## 2022-03-04 RX ADMIN — SODIUM CHLORIDE, POTASSIUM CHLORIDE, SODIUM LACTATE AND CALCIUM CHLORIDE 25 ML/HR: 600; 310; 30; 20 INJECTION, SOLUTION INTRAVENOUS at 10:44

## 2022-03-04 RX ADMIN — Medication 25 MG: at 15:24

## 2022-03-04 RX ADMIN — PROPOFOL 80 MG: 10 INJECTION, EMULSION INTRAVENOUS at 13:01

## 2022-03-04 NOTE — PROGRESS NOTES
End of Shift Note    Bedside shift change report given to [address] [place] [address] Street, RN (oncoming nurse) by Branden Fletcher RN (offgoing nurse). Report included the following information SBAR, Kardex, OR Summary, MAR and Recent Results    Shift worked:  7a to 7p     Shift summary and any significant changes:     Arrived on unit at  200. Bedrest until 2000, then check left groin dressing frequently. Was straight cathed at 1700 for 800 clear yellow urine. One small area of excoriation note under abdominal pannus, red area, pt states it's been there a long time. Concerns for physician to address:       Zone phone for oncoming shift:   [phone]       Activity:  Activity Level: Bed Rest  Number times ambulated in hallways past shift: 0  Number of times OOB to chair past shift: 0    Cardiac:   Cardiac Monitoring: No      Cardiac Rhythm: AV Paced    Access:   Current line(s): PIV     Genitourinary:   Urinary status: due to void    Respiratory:   O2 Device: Nasal mask  Chronic home O2 use?: NO  Incentive spirometer at bedside: NO       GI:  Last Bowel Movement Date: 03/03/22  Current diet:  ADULT DIET Regular  Passing flatus: YES  Tolerating current diet: YES       Pain Management:   Patient states pain is manageable on current regimen: YES    Skin:  Dylan Score: 21  Interventions: increase time out of bed    Patient Safety:  Fall Score:  Total Score: 1  Interventions: gripper socks and pt to call before getting OOB  High Fall Risk: Yes    Length of Stay:  Expected LOS: - - -  Actual LOS: 0      Branden Fletcher RN

## 2022-03-04 NOTE — BRIEF OP NOTE
Brief Postoperative Note    Patient: Jose Murray  YOB: 1960  MRN: [id number]    Date of Procedure: 3/4/2022     Pre-Op Diagnosis: Chronic Mesenteric ischemia    Post-Op Diagnosis: Same as preoperative diagnosis. Procedure(s):  ANGIOPLASTY AND STENTING OF LEFT ILIAC AND SUPERIOR MESENTERIC ARTERY. IVUS of SMA. Surgeon(s):  Radha Langston MD    Surgical Assistant: Surg Asst-1: Tavo Acosta    Anesthesia: General     Estimated Blood Loss (mL): less than 881     Complications: None    Specimens: * No specimens in log *     Implants:   Implant Name Type Inv. Item Serial No.  Lot No. LRB No. Used Action   GRAFT STNT EXP [id number] -- Ping Handy  GRAFT STNT EXP [id number] -- Marylene Lark [de-identified]  [employer] N/A Left 1 Implanted   STENT BILI RENAL MR [id number] -- [place] [id number] - SN/A  STENT BILI RENAL MR [id number] -- [place] [id number] N/A [employer] [id number] N/A 1 Implanted   GRAFT STNT EXP [id number] -- Carol Lush [de-identified]  GRAFT STNT EXP [id number] -- Javier  VBX [de-identified] [employer] N/A N/A 1 Implanted       Drains: * No LDAs found *    Findings: Severe left BRADY stenosis proximal to existing stent. Lt BRADY PTA/stent w/ 7x39 VBX. Severe SMA stenosis within and distal to existing stent. IVUS showed mural thrombus within existing VBX. Distally stented w/ 6x18 Express BMS. Jailed middle colic intentionally and extended into existing VBX. Extended proximally w/ 6x15 VBX extending into aorta. Good result w/ no residual stenosis. Middle colic remained patent but appeared smaller. Lt CFA 7 Fr closed w/ Perclose w/ small hematoma that resolved w/ hand hold.     Electronically Signed by Luz Kovacs MD on 3/4/2022 at 2:04 PM

## 2022-03-04 NOTE — PERIOP NOTES
0957 - PT'S COVID TEST RESULTED NEG - PT DENIES FEVER, COLD, COUGH, SOB, N/V, DIARRHEA. .. PRE-OP TCHING DONE PT VERBALIZES UNDERSTANDING. STRETCHER IN LOWEST POSITION, CB IN PLACE AND SR UP X2.

## 2022-03-04 NOTE — ANESTHESIA PREPROCEDURE EVALUATION
Anesthetic History   No history of anesthetic complications            Review of Systems / Medical History  Patient summary reviewed, nursing notes reviewed and pertinent labs reviewed    Pulmonary          Smoker      Comments: Former smoker   Neuro/Psych         Psychiatric history    Comments: Anxiety  Depression  Sciatica   Peripheral neuropathy   Cardiovascular    Hypertension: well controlled      CHF  Dysrhythmias : atrial fibrillation  Pacemaker, CAD, PAD, CABG (2010) and hyperlipidemia    Exercise tolerance: >4 METS  Comments: Pacemaker/ICD [employer]tr[employer]   ECHO 2018 EF=60%     GI/Hepatic/Renal     GERD: well controlled          Comments: Anand's Esophagus Endo/Other      Hypothyroidism  Obesity and arthritis     Other Findings   Comments: MESENTERIC ISCHEMIA  Rt thyroid nodule stable            Physical Exam    Airway  Mallampati: III  TM Distance: < 4 cm  Neck ROM: normal range of motion   Mouth opening: Normal     Cardiovascular  Regular rate and rhythm,  S1 and S2 normal,  no murmur, click, rub, or gallop             Dental  No notable dental hx       Pulmonary  Breath sounds clear to auscultation               Abdominal  GI exam deferred       Other Findings            Anesthetic Plan    ASA: 4  Anesthesia type: general          Induction: Intravenous  Anesthetic plan and risks discussed with: Patient      2nd IV

## 2022-03-04 NOTE — ANESTHESIA POSTPROCEDURE EVALUATION
Procedure(s):  ANGIOPLASTY AND STENTING OF LEFT ILIAC AND SUPERIOR MESENTERIC ARTERY. MAC    Anesthesia Post Evaluation        Patient location during evaluation: PACU  Note status: Adequate. Level of consciousness: responsive to verbal stimuli and sleepy but conscious  Pain management: satisfactory to patient  Airway patency: patent  Anesthetic complications: no  Cardiovascular status: acceptable  Respiratory status: acceptable  Hydration status: acceptable  Comments: +Post-Anesthesia Evaluation and Assessment    Patient: Porter Forbes MRN: [id number]  SSN: [id number]   YOB: 1960  Age: 57 y.o. Sex: female      Cardiovascular Function/Vital Signs    BP (!) 118/46   Pulse 61   Temp 36.5 °C (97.7 °F)   Resp 15   Ht 4' 11\" (1.499 m)   Wt 73.4 kg (161 lb 13.1 oz)   SpO2 100%   BMI 32.68 kg/m²     Patient is status post Procedure(s):  ANGIOPLASTY AND STENTING OF LEFT ILIAC AND SUPERIOR MESENTERIC ARTERY. Nausea/Vomiting: Controlled. Postoperative hydration reviewed and adequate. Pain:  Pain Scale 1: Numeric (0 - 10) (03/04/22 1450)  Pain Intensity 1: 0 (03/04/22 1450)   Managed. Neurological Status:   Neuro (WDL): Within Defined Limits (03/04/22 0957)   At baseline. Mental Status and Level of Consciousness: Arousable. Pulmonary Status:   O2 Device: Nasal cannula (03/04/22 1420)   Adequate oxygenation and airway patent. Complications related to anesthesia: None    Post-anesthesia assessment completed. No concerns. Signed By: Yu Jackson MD    3/4/2022  Post anesthesia nausea and vomiting:  controlled      INITIAL Post-op Vital signs:   Vitals Value Taken Time   /50 03/04/22 1650   Temp 36.5 °C (97.7 °F) 03/04/22 1420   Pulse 61 03/04/22 1654   Resp 15 03/04/22 1654   SpO2 100 % 03/04/22 1654   Vitals shown include unvalidated device data.

## 2022-03-04 NOTE — PERIOP NOTES
Handoff Report from Operating Room to PACU    Report received from [facility] and [address] [place] [address] Derick Rios. Surgeon(s):  Jackelyn Murphy MD  And Procedure(s) (LRB):  ANGIOPLASTY AND STENTING OF LEFT ILIAC AND SUPERIOR MESENTERIC ARTERY (Left)  confirmed   with allergies and dressings discussed. Anesthesia type, drugs, patient history, complications, estimated blood loss, vital signs, intake and output, and last pain medication and reversal medications were reviewed. 1423 pt arrived pacu hypotensive, notified Dr. Irish Alvarez received orders for ephedrine 10mg iv & fluid bolus see mar  1513 SBP<90 bolus infusing, received repeat order for Ephedrine 10mg IV and Ephedrine  25mg IM, see mar    6859 discussed with Dr. Michelle Ahmadi low bp in pacu & treatment provided. Received order for phenylephrine & may trsf patient step down if needed. Also discussed with Dr. Michelle Ahmadi firmness in left groin, MD examined  Patient. MD reported no change from initial hematoma that was treated in OR. Monitoring patient.

## 2022-03-04 NOTE — PERIOP NOTES
TRANSFER - OUT REPORT:    Verbal report given to Gil Benson RN on Jordan Chen  being transferred to [facility](unit) for routine post - op       Report consisted of patients Situation, Background, Assessment and   Recommendations(SBAR). Information from the following report(s) SBAR, OR Summary, Procedure Summary, Intake/Output, MAR and Cardiac Rhythm NSR was reviewed with the receiving nurse. Opportunity for questions and clarification was provided.       Patient transported with:   O2 @ 2 liters  Registered Nurse

## 2022-03-05 LAB
ANION GAP SERPL CALC-SCNC: 4 MMOL/L (ref 5–15)
BUN SERPL-MCNC: 14 MG/DL (ref 6–20)
BUN/CREAT SERPL: 13 (ref 12–20)
CALCIUM SERPL-MCNC: 8.2 MG/DL (ref 8.5–10.1)
CHLORIDE SERPL-SCNC: 112 MMOL/L (ref 97–108)
CO2 SERPL-SCNC: 26 MMOL/L (ref 21–32)
CREAT SERPL-MCNC: 1.1 MG/DL (ref 0.55–1.02)
ERYTHROCYTE [DISTWIDTH] IN BLOOD BY AUTOMATED COUNT: 12.6 % (ref 11.5–14.5)
GLUCOSE SERPL-MCNC: 88 MG/DL (ref 65–100)
HCT VFR BLD AUTO: 31.5 % (ref 35–47)
HGB BLD-MCNC: 10.6 G/DL (ref 11.5–16)
MCH RBC QN AUTO: 31.5 PG (ref 26–34)
MCHC RBC AUTO-ENTMCNC: 33.7 G/DL (ref 30–36.5)
MCV RBC AUTO: 93.5 FL (ref 80–99)
NRBC # BLD: 0 K/UL (ref 0–0.01)
NRBC BLD-RTO: 0 PER 100 WBC
PLATELET # BLD AUTO: 114 K/UL (ref 150–400)
PMV BLD AUTO: 9.1 FL (ref 8.9–12.9)
POTASSIUM SERPL-SCNC: 4.4 MMOL/L (ref 3.5–5.1)
RBC # BLD AUTO: 3.37 M/UL (ref 3.8–5.2)
SODIUM SERPL-SCNC: 142 MMOL/L (ref 136–145)
WBC # BLD AUTO: 4.3 K/UL (ref 3.6–11)

## 2022-03-05 PROCEDURE — 36415 COLL VENOUS BLD VENIPUNCTURE: CPT

## 2022-03-05 PROCEDURE — 74011250636 HC RX REV CODE- 250/636: Performed by: SURGERY

## 2022-03-05 PROCEDURE — 80048 BASIC METABOLIC PNL TOTAL CA: CPT

## 2022-03-05 PROCEDURE — 74011250637 HC RX REV CODE- 250/637: Performed by: SURGERY

## 2022-03-05 PROCEDURE — G0378 HOSPITAL OBSERVATION PER HR: HCPCS

## 2022-03-05 PROCEDURE — 85027 COMPLETE CBC AUTOMATED: CPT

## 2022-03-05 RX ADMIN — SODIUM CHLORIDE 75 ML/HR: 9 INJECTION, SOLUTION INTRAVENOUS at 02:53

## 2022-03-05 RX ADMIN — ATORVASTATIN CALCIUM 80 MG: 40 TABLET, FILM COATED ORAL at 23:24

## 2022-03-05 RX ADMIN — RIVAROXABAN 2.5 MG: 2.5 TABLET, FILM COATED ORAL at 07:40

## 2022-03-05 RX ADMIN — ACETAMINOPHEN 325MG 650 MG: 325 TABLET ORAL at 07:38

## 2022-03-05 RX ADMIN — MELATONIN 1.5 MG: at 23:24

## 2022-03-05 RX ADMIN — RIVAROXABAN 2.5 MG: 2.5 TABLET, FILM COATED ORAL at 17:12

## 2022-03-05 RX ADMIN — ACETAMINOPHEN 325MG 650 MG: 325 TABLET ORAL at 23:24

## 2022-03-05 RX ADMIN — ASPIRIN 81 MG: 81 TABLET, COATED ORAL at 07:39

## 2022-03-05 RX ADMIN — SODIUM CHLORIDE 75 ML/HR: 9 INJECTION, SOLUTION INTRAVENOUS at 14:54

## 2022-03-05 NOTE — PROGRESS NOTES
Discussed with nursing  Low BPs despite holding meds  Not really symptomatic  No evidence of bleeding  Will hold discharge one more day

## 2022-03-05 NOTE — OP NOTES
[address]  OPERATIVE REPORT    Name:  Morena aWtson  MR#:  [id number]  :  1960  ACCOUNT #:  [de-identified]  DATE OF SERVICE:  2022    PREOPERATIVE DIAGNOSIS:  Chronic mesenteric ischemia. POSTOPERATIVE DIAGNOSES:  Chronic mesenteric ischemia and severe left common iliac artery stenosis. PROCEDURES PERFORMED:  1. Left iliac arteriogram.  2.  Angioplasty and stenting of left common iliac artery. 3.  Abdominal aortogram.  4.  Selective superior mesenteric arteriograms. 5.  Intravascular ultrasound of superior mesenteric artery. 6.  Angioplasty and stenting of superior mesenteric artery. SURGEON:  Danita Vogel MD    ASSISTANT:  None. ANESTHESIA:  MAC converted to general.    COMPLICATIONS:  None. SPECIMENS REMOVED:  None. IMPLANTS:  A 7 x 39 VBX stent in left common iliac artery, 6 x 18 [employer] bare metal Express stent in SMA and 6 x 15 VBX stent in SMA. ESTIMATED BLOOD LOSS:  Less than 100 mL. DRAINS:  None. INDICATIONS:  The patient is a 64-year-old female with a history of significant peripheral arterial disease. She has an existing 6 x 19 VBX stent in her proximal SMA. She has recurrent abdominal pain and noninvasive imaging suggests a severe stenosis just distal to the existing stent. She presents for mesenteric angiography with possible intervention. PROCEDURE:  After informed consent was obtained, the patient was given preoperative intravenous antibiotics within 1 hour of the incision. She was taken to the operating room and after establishing adequate sedation. Under local anesthesia and real-time ultrasound guidance, the left common femoral artery was percutaneously cannulated and a 6-Ukrainian sheath was placed. On inserting the sheath wire, the wire advanced about CHCF easily and then met resistance.   Because of this, the left pelvis was observed under fluoroscopy as an attempt was made to advance a Glidewire up the left iliac artery. There was an existing stent in the left iliac artery and resistance was encountered just proximal to this stent. A retrograde left iliac arteriogram was performed injecting through the sheath and this showed that there was an 80-90% stenosis in the left common iliac artery proximal to an existing iliac artery stent. This was crossed with a Glidewire. The patient was systemically heparinized and the left common iliac artery was stented with a 7 x 39 VBX balloon expandable stent so that this area could be crossed for evaluation and treatment of the SMA. Repeat left iliac arteriogram showed an excellent result with good position of the stent and no residual left iliac stenosis. A Glidewire was advanced up into the upper abdominal aorta and a 7-Malay TourGuide sheath was then advanced up into the aorta at the level of the existing stent. An abdominal aortogram was performed in a lateral projection and this showed very little detail of the SMA due to respiratory motion artifact. The stent was selectively cannulated using a Glidewire and the steerable TourGuide sheath which was placed in the ostium. Angiograms of the SMA were done both on subtraction and without subtraction but there was very poor anatomic detail of the SMA due to persistent respiratory motion artifact. In fact, it was not clear to me whether there could even be a dissection in the SMA for passage of the Glidewire. I simply could not determine whether there was a significant stenosis there and if so with the proximal and distal endpoints were and therefore decision was made to convert the patient to a general anesthetic so that her respirations could be paused for subtraction angiograms. When this was done, a subtraction angiogram did clarify her anatomy.   There appeared to potentially be some recurrent stenosis within the existing VBX stent and there did appear to be a severe stenosis just distal to the existing stent and the artery did not normalize until just after the takeoff of the middle colic. The artery was then normal and large for a relatively short segment and then terminated into multiple branches. Because there was a somewhat unusual appearance of the existing stent and the SMA itself, I wanted to interrogate the SMA with intravascular ultrasound to clarify the proximal and distal extents of disease and to make sure there was no dissection. A 0.014 wire was placed and intravascular ultrasound was used to examine the existing stent and the native SMA. This actually showed that there was significant mural thrombus within the distal portion of the existing stent probably due to the severe outflow stenosis. There was a severe outflow stenosis distal to the existing stent and the artery did not normalize until just distal to the middle colic. There was no evidence of dissection. The proximal and distal extent of disease were marked. I felt that it would be necessary to MCFP the middle colic to effectively treat this stenosis. The SMA was then stented with a 6 x 18 Express bare metal stent extending from just distal to the middle colic into the existing VBX stent. This was inflated to profile. Next, because of the mural thrombus and significant recurrent stenosis from that within the existing VBX stent, I placed an additional 6 x 15 VBX stent extending from the bare-metal stent proximal to the middle colic out into the aorta and this  was inflated up to burst pressure and there was still a slight waste at the ostium. Completion angiograms revealed a good result with no significant residual stenosis and good position of all the stents and preserved flow into the middle colic and all of the distal SMA branches. Wires and catheters were then removed and the left femoral puncture site was closed with a Perclose device.   Dermabond was applied to the skin at the puncture site and the patient was extubated and transferred to the PACU in stable condition. Carolyn Arteaga MD      [id number]  D:  03/05/2022 11:38  T:  03/05/2022 12:42  JOB #:  [id number]  CC:   Lucinda Velasco MD

## 2022-03-05 NOTE — PROGRESS NOTES
5701 Call placed to Dr. Melita Lee re: BP low now at 91/40.,  BP meds held this am for BP of 104/41. (Dr. Joe Romero aware of low BP this am). [phone] Discharge cancelled due to low BP.

## 2022-03-05 NOTE — PROGRESS NOTES
Problem: Falls - Risk of  Goal: *Absence of Falls  Description: Document Diana Maldonado Fall Risk and appropriate interventions in the flowsheet.   Outcome: Resolved/Met     Problem: Patient Education: Go to Patient Education Activity  Goal: Patient/Family Education  Outcome: Resolved/Met

## 2022-03-05 NOTE — PROGRESS NOTES
End of Shift Note    Bedside shift change report given to [address] [place] [address] [address], RN (oncoming nurse) by Claudio Means RN (offgoing nurse). Report included the following information SBAR, Kardex, OR Summary, MAR and Recent Results    Shift worked:  7a to 7p     Shift summary and any significant changes:     Up to bathroom with one assist, no c/o pain at present. Some bruising noted around left groin site. BP very soft today, discharge put off until tomorrow to observe BP     Concerns for physician to address:       Zone phone for oncoming shift:   [phone]       Activity:  Activity Level: Up with Assistance  Number times ambulated in hallways past shift: up ad shirin  Number of times OOB to chair past shift:  Up ad shirin    Cardiac:   Cardiac Monitoring: No      Cardiac Rhythm: AV Paced    Access:   Current line(s): PIV     Genitourinary:   Urinary status: due to void    Respiratory:   O2 Device: None (Room air)  Chronic home O2 use?: NO  Incentive spirometer at bedside: NO       GI:  Last Bowel Movement Date: 03/03/22  Current diet:  ADULT DIET Regular  Passing flatus: YES  Tolerating current diet: YES       Pain Management:   Patient states pain is manageable on current regimen: YES    Skin:  Dylan Score: 21  Interventions: increase time out of bed    Patient Safety:  Fall Score:  Total Score: 2  Interventions: gripper socks and pt to call before getting OOB  High Fall Risk: Yes    Length of Stay:  Expected LOS: - - -  Actual LOS: 0      Claudio Means RN

## 2022-03-05 NOTE — H&P
History and Physical    Subjective:     Jenny Reddy is a 57 y.o. female who has an existing SMA stent with a severe stenosis just distal to that stent. She has recurrent abdominal pain and presents for mesenteric angiogram and probable additional stenting.      Past Medical History:   Diagnosis Date    Arrhythmia     hx A-fib    Arthritis     CAD (coronary artery disease)     CABG     Chronic kidney disease     GERD (gastroesophageal reflux disease)     Heart failure (HCC)     Hypertension     Other ill-defined conditions(799.89)     epidural injections    Pacemaker 2018    dual chamber    Psychiatric disorder     anxiety/depression, years ago per patient    PVD (peripheral vascular disease) ([address]     left iliac stent    Right thyroid nodule 2014    stable      Past Surgical History:   Procedure Laterality Date    COLONOSCOPY,DIAGNOSTIC  3/6/2015         HX BACK SURGERY      microdiscectomy 2009 L4-L5, and in     HX GYN      D&C    HX GYN       x3    HX HEENT      septoplasty    HX HYSTEROSCOPY WITH ENDOMETRIAL ABLATION      HX IMPLANTABLE CARDIOVERTER DEFIBRILLATOR  2011    removed in 2018    HX ORTHOPAEDIC      surgery on right hand with tendon cut and repaired    HX OTHER SURGICAL      EGD-Anand's Esophagus    HX PACEMAKER      HX TONSILLECTOMY      HX TUBAL LIGATION      UT CABG, ARTERY-VEIN, THREE  2010    VASCULAR SURGERY PROCEDURE UNLIST  2021    angioplasty and stenting-left brachial cutdown     Family History   Problem Relation Age of Onset   [facility] Arthritis-rheumatoid Mother     Elevated Lipids Mother     Thyroid Disease Mother         hypothyroidism    Elevated Lipids Father     Stroke Father     Diabetes Father     Emphysema Father     Heart Disease Father     Heart Attack Brother 50    Heart Disease Maternal Grandmother     Hypertension Maternal Grandmother     Thyroid Disease Sister         hypothyroidism      Social History Tobacco Use    Smoking status: Former Smoker     Packs/day: 1.00     Years: 37.00     Pack years: 37.00     Quit date: 2010     Years since quittin.1    Smokeless tobacco: Never Used   Substance Use Topics    Alcohol use: Yes     Alcohol/week: 1.0 standard drink     Types: 1 Shots of liquor per week     Comment: once a week       Prior to Admission medications    Medication Sig Start Date End Date Taking? Authorizing Provider   diclofenac EC (VOLTAREN) 50 mg EC tablet Take 50 mg by mouth two (2) times a day. Yes Provider, Historical   lisinopril (PRINIVIL, ZESTRIL) 10 mg tablet Take 10 mg by mouth daily. Yes Provider, Historical   CALCIUM CARBONATE (CALCIUM 600 PO) Take 1,200 mg by mouth daily. Yes Provider, Historical   cholecalciferol (VITAMIN D3) 1,000 unit tablet Take 1,000 Units by mouth daily. Yes Provider, Historical   melatonin 3 mg tablet Take 1.5 mg by mouth nightly. 1/2 tablet qhs    Yes Provider, Historical   ACETAMINOPHEN/DIPHENHYDRAMINE (TYLENOL PM PO) Take 1 Tablet by mouth nightly. Yes Provider, Historical   isosorbide mononitrate ER (IMDUR) 30 mg tablet Take 15 mg by mouth daily. Yes Provider, Historical   atorvastatin (LIPITOR) 80 mg tablet Take 80 mg by mouth nightly. Yes Provider, Historical   aspirin (ASPIRIN) 325 mg tablet Take 325 mg by mouth daily. Yes Provider, Historical     No Known Allergies     Review of Systems:  Denies CP/SOB    Objective:     Physical Exam:   Visit Vitals  BP (!) 104/41   Pulse 68   Temp 98.2 °F (36.8 °C)   Resp 17   Ht 4' 11\" (1.499 m)   Wt 73.4 kg (161 lb 13.1 oz)   SpO2 94%   BMI 32.68 kg/m²     General:  Alert, cooperative, no distress, appears stated age. Head:  Normocephalic, without obvious abnormality, atraumatic. Neck: Supple, symmetrical, trachea midline, no adenopathy, thyroid: no enlargement/tenderness/nodules, no carotid bruit and no JVD. Lungs:   Clear to auscultation bilaterally.        Heart: Regular rate and rhythm, S1, S2 normal, no murmur, click, rub or gallop. Abdomen:   Soft, non-tender. Bowel sounds normal. No masses,  No organomegaly. Extremities: Extremities normal, atraumatic, no cyanosis or edema. Pulses: 1+ LUE. Neurologic: Normal strength, sensation and throughout.        Assessment:     Active Problems:    Chronic mesenteric ischemia ([address] (3/4/2022)        Plan:     Mesenteric angiogram via open left brachial approach    Signed By: Tamie Ramirez MD     March 5, 2022

## 2022-03-06 VITALS
OXYGEN SATURATION: 95 % | BODY MASS INDEX: 32.62 KG/M2 | HEART RATE: 69 BPM | SYSTOLIC BLOOD PRESSURE: 119 MMHG | HEIGHT: 59 IN | DIASTOLIC BLOOD PRESSURE: 40 MMHG | WEIGHT: 161.82 LBS | TEMPERATURE: 98.3 F | RESPIRATION RATE: 16 BRPM

## 2022-03-06 PROCEDURE — G0378 HOSPITAL OBSERVATION PER HR: HCPCS

## 2022-03-06 PROCEDURE — 74011250637 HC RX REV CODE- 250/637: Performed by: SURGERY

## 2022-03-06 RX ADMIN — ISOSORBIDE MONONITRATE 15 MG: 30 TABLET, EXTENDED RELEASE ORAL at 11:10

## 2022-03-06 RX ADMIN — ASPIRIN 81 MG: 81 TABLET, COATED ORAL at 08:07

## 2022-03-06 RX ADMIN — RIVAROXABAN 2.5 MG: 2.5 TABLET, FILM COATED ORAL at 08:07

## 2022-03-06 RX ADMIN — LISINOPRIL 10 MG: 5 TABLET ORAL at 11:10

## 2022-03-06 NOTE — PROGRESS NOTES
DISCHARGE NOTE FROM [facility] NURSE    Patient determined to be stable for discharge by attending provider. I have reviewed the discharge instructions and follow-up appointments with the patient. They verbalized understanding and all questions were answered to their satisfaction. No complaints or further questions were expressed. Medications sent to pharmacy. Appropriate educational materials and medication side effect teaching were provided. PIV were removed prior to discharge. Patient did not discharge with any line, villanueva, or drain. All personal items collected during admission were returned to the patient prior to discharge.     Post-op patient: Elsa Trevino RN

## 2022-03-06 NOTE — PROGRESS NOTES
Problem: Falls - Risk of  Goal: *Absence of Falls  Description: Document Shade Baldwin Fall Risk and appropriate interventions in the flowsheet.   Outcome: Progressing Towards Goal  Note: Fall Risk Interventions:  Mobility Interventions: Assess mobility with egress test         Medication Interventions: Teach patient to arise slowly    Elimination Interventions: Call light in reach

## 2022-03-06 NOTE — PROGRESS NOTES
Patient scheduled to discharge home today self care. Requires OBS notice d/t OBS status.         Curley Sacks, MSW

## 2022-03-06 NOTE — DISCHARGE INSTRUCTIONS
DISCHARGE SUMMARY from Nurse    The following personal items are in your possession at time of discharge:    Dental Appliances: None  Visual Aid: None  Home Medications: None  Jewelry: None  Clothing: None (left in in pt. room)  Other Valuables: None             PATIENT INSTRUCTIONS:    Report the following to your surgeon:  · Excessive pain, swelling, redness or odor of or around the surgical area  · Temperature over 100.5  · Nausea and vomiting lasting longer than 4 hours or if unable to take medications  · Any signs of decreased circulation or nerve impairment to extremity: change in color, persistent  numbness, tingling, coldness or increase pain  · Any questions    To prevent infection remember to use good handwashing. Your surgeons phone number is [phone]      What to do at Home:  Recommended activity: Activity as tolerated. Recommended diet: Regular    If you have surgical incisions you may shower. Please do not scrub incision, let water flow over area and pat dry. Do not tub bathe, get in a hot tub or swim; until your wounds have healed and given the OK by your surgeon to do so. *  Please give a list of your current medications to your Primary Care Provider. *  Please update this list whenever your medications are discontinued, doses are      changed, or new medications (including over-the-counter products) are added. *  Please carry medication information at all times in case of emergency situations. These are general instructions for a healthy lifestyle:    No smoking/ No tobacco products/ Avoid exposure to second hand smoke    Surgeon General's Warning:  Quitting smoking now greatly reduces serious risk to your health.     Obesity, smoking, and sedentary lifestyle greatly increases your risk for illness    A healthy diet, regular physical exercise & weight monitoring are important for maintaining a healthy lifestyle    You may be retaining fluid if you have a history of heart failure or if you experience any of the following symptoms:  Weight gain of 3 pounds or more overnight or 5 pounds in a week, increased swelling in our hands or feet or shortness of breath while lying flat in bed. Please call your doctor as soon as you notice any of these symptoms; do not wait until your next office visit. Recognize signs and symptoms of STROKE:    F-face looks uneven    A-arms unable to move or move unevenly    S-speech slurred or non-existent    T-time-call 911 as soon as signs and symptoms begin-DO NOT go       Back to bed or wait to see if you get better-TIME IS BRAIN. The discharge information has been reviewed with the patient. The patient verbalized understanding.

## 2022-03-06 NOTE — PROGRESS NOTES
Problem: Falls - Risk of  Goal: *Absence of Falls  Description: Document Lani Newby Fall Risk and appropriate interventions in the flowsheet.   3/6/2022 1112 by Kacie English RN  Outcome: Resolved/Met  3/6/2022 0938 by Kacie English RN  Outcome: Progressing Towards Goal  Note: Fall Risk Interventions:  Mobility Interventions: Assess mobility with egress test         Medication Interventions: Teach patient to arise slowly    Elimination Interventions: Call light in reach              Problem: Patient Education: Go to Patient Education Activity  Goal: Patient/Family Education  Outcome: Resolved/Met

## 2022-03-18 PROBLEM — I65.22 CAROTID STENOSIS, ASYMPTOMATIC, LEFT: Status: ACTIVE | Noted: 2019-06-07

## 2022-03-19 PROBLEM — K55.1 CHRONIC MESENTERIC ISCHEMIA (HCC): Status: ACTIVE | Noted: 2022-03-04

## 2022-03-20 PROBLEM — Z95.0 CARDIAC PACEMAKER: Status: ACTIVE | Noted: 2018-08-21

## 2022-05-09 NOTE — PROGRESS NOTES
Letter written for son's work. Problem: Mobility Impaired (Adult and Pediatric)  Goal: *Acute Goals and Plan of Care (Insert Text)  Note:   PHYSICAL THERAPY EVALUATION/Discharge  Patient: Prabhjot Bello (24 y.o. female)  Date: 6/8/2019  Primary Diagnosis: Carotid stenosis, asymptomatic, left [I65.22]  Procedure(s) (LRB):  LEFT CAROTID ARTERY ENDARTERECTOMY (Left) 1 Day Post-Op   Precautions: standard       ASSESSMENT :  Based on the objective data described below, the patient presents with good strength, balance, and functional mobility skills. Patient supine upon arrival, agreeable to PT. Independent bed mobility and transfers. Patient ambulated 200' with no assistive device with good balance. Patient also ambulated up and down 3 steps with one handrail and SBA without difficulty. Patient returned to room to chair at end of session. Prior to admission patient was independent with all mobility; lives with  and works from home. No further PT recommended at this time as patient is at baseline and independent with all mobility. Caro Gearing PLAN  Discharge Recommendations: None  Further Equipment Recommendations for Discharge: none      SUBJECTIVE:   Patient stated ? I'm ready to get out of here. ?    OBJECTIVE DATA SUMMARY:   HISTORY:    Past Medical History:   Diagnosis Date    Arrhythmia     hx A-fib    Arthritis     CAD (coronary artery disease)     CABG 2010    GERD (gastroesophageal reflux disease)     Heart failure (HCC)     Hypertension     Other ill-defined conditions(799.89)     epidural injections    Pacemaker 08/2018    dual chamber    Psychiatric disorder     anxiety/depression, years ago per patient    PVD (peripheral vascular disease) ([facility] [address]     left iliac stent    Right thyroid nodule 01/22/2014    stable     Past Surgical History:   Procedure Laterality Date    CABG, ARTERY-VEIN, THREE  Nov 2010    COLONOSCOPY,DIAGNOSTIC  3/6/2015         HX BACK SURGERY      microdiscectomy 2009 L4-L5, and in 2010    HX GYN      D&C    HX GYN  x3    HX HEENT      septoplasty    HX HYSTEROSCOPY WITH ENDOMETRIAL ABLATION      HX IMPLANTABLE CARDIOVERTER DEFIBRILLATOR  2011    removed in 2018    HX ORTHOPAEDIC      surgery on right hand with tendon cut and repaired    HX OTHER SURGICAL      angioplasty-left iliac    HX OTHER SURGICAL      EGD-Anand's Esophagus    HX TONSILLECTOMY      HX TUBAL LIGATION       Prior Level of Function/Home Situation: Lives with ; independent with all mobility  Personal factors and/or comorbidities impacting plan of care: none    Home Situation  Home Environment: Private residence  # Steps to Enter: 4  Rails to Enter: Yes  Hand Rails : Left  One/Two Story Residence: One story  Living Alone: No  Support Systems: Child(trinidad), [employer], Family member(s), Friends \ neighbors, Spouse/Significant Other/Partner  Patient Expects to be Discharged to[de-identified] Private residence  Current DME Used/Available at Home: None  Tub or Shower Type: Shower    EXAMINATION/PRESENTATION/DECISION MAKING:   Critical Behavior:  Neurologic State: Alert, Appropriate for age  Orientation Level: Oriented X4  Cognition: Appropriate decision making, Follows commands  Safety/Judgement: Awareness of environment, Insight into deficits, Home safety, Fall prevention  Hearing: Auditory  Auditory Impairment: None  Hearing Aids/Status: Does not own  Skin:  dressing dry and intact  Edema: none noted  Range Of Motion:  AROM: Within functional limits           PROM: Within functional limits           Strength:    Strength:  Within functional limits                    Tone & Sensation:   Tone: Normal              Sensation: Intact               Coordination:  Coordination: Within functional limits  Vision:      Functional Mobility:  Bed Mobility:  Rolling: Independent  Supine to Sit: Independent  Sit to Supine: Independent  Scooting: Independent  Transfers:  Sit to Stand: Independent  Stand to Sit: Independent        Bed to Chair: Independent              Balance:   Sitting: Intact  Standing: Intact  Ambulation/Gait Training:  Distance (ft): 200 Feet (ft)     Ambulation - Level of Assistance: Supervision        Gait Abnormalities: Decreased step clearance              Speed/Mago: Fluctuations  Step Length: Left shortened;Right shortened                     Stairs:  Number of Stairs Trained: 3  Stairs - Level of Assistance: Contact guard assistance   Rail Use: Right         Functional Measure:  Barthel Index:    Bathin  Bladder: 10  Bowels: 10  Groomin  Dressing: 10  Feeding: 10  Mobility: 15  Stairs: 10  Toilet Use: 10  Transfer (Bed to Chair and Back): 15  Total: 100/100       Percentage of impairment   0%   1-19%   20-39%   40-59%   60-79%   80-99%   100%   Barthel Score 0-100 100 99-80 79-60 59-40 20-39 1-19   0     The Barthel ADL Index: Guidelines  1. The index should be used as a record of what a patient does, not as a record of what a patient could do. 2. The main aim is to establish degree of independence from any help, physical or verbal, however minor and for whatever reason. 3. The need for supervision renders the patient not independent. 4. A patient's performance should be established using the best available evidence. Asking the patient, friends/relatives and nurses are the usual sources, but direct observation and common sense are also important. However direct testing is not needed. 5. Usually the patient's performance over the preceding 24-48 hours is important, but occasionally longer periods will be relevant. 6. Middle categories imply that the patient supplies over 50 per cent of the effort. 7. Use of aids to be independent is allowed. Harlee Cowden., Barthel, D.W. (9782). Functional evaluation: the Barthel Index. [address] [facility] (14)2. Sergio Dc kaitlynn JUDY Aldana, Robert Mar, Nadir Oliveira., Sudhir, [address] [place] [address] ().  Measuring the change indisability after inpatient rehabilitation; comparison of the responsiveness of the Barthel Index and Functional [place] Measure. Journal of Neurology, Neurosurgery, and Psychiatry, 66(4), 641-606. JO ANN Andersen, KAYLEIGH Morales, & Anjana Blakely M.A. (2004.) Assessment of post-stroke quality of life in cost-effectiveness studies: The usefulness of the Barthel Index and the EuroQoL-5D. Quality of Life Research, 15, 306-27            Physical Therapy Evaluation Charge Determination   History Examination Presentation Decision-Making   LOW Complexity : Zero comorbidities / personal factors that will impact the outcome / POC LOW Complexity : 1-2 Standardized tests and measures addressing body structure, function, activity limitation and / or participation in recreation  LOW Complexity : Stable, uncomplicated  LOW Complexity : FOTO score of       Based on the above components, the patient evaluation is determined to be of the following complexity level: LOW     Pain:  Pain Scale 1: Numeric (0 - 10)  Pain Intensity 1: 0           Pain Intervention(s) 1: Declines  Activity Tolerance:   good  Please refer to the flowsheet for vital signs taken during this treatment. After treatment:   ?         Patient left in no apparent distress sitting up in chair  ? Patient left in no apparent distress in bed  ? Call bell left within reach  ? Nursing notified  ? Caregiver present  ? Bed alarm activated    COMMUNICATION/EDUCATION:   The patient?s plan of care was discussed with: Occupational Therapist and Registered Nurse. ?         Fall prevention education was provided and the patient/caregiver indicated understanding. ? Patient/family have participated as able in goal setting and plan of care. ?         Patient/family agree to work toward stated goals and plan of care. ?         Patient understands intent and goals of therapy, but is neutral about his/her participation. ?          Patient is unable to participate in goal setting and plan of care.     Thank you for this referral.  Kaleb Baker, PT   Time Calculation: 13 mins

## 2022-05-16 ENCOUNTER — DOCUMENTATION ONLY (OUTPATIENT)
Dept: PHARMACY | Age: 62
End: 2022-05-16

## 2022-05-16 NOTE — LETTER
Stephane [address]  [address], [place] [address]  Phone: toll free [phone] Option #3        Ms. Parish Davis  [address] [facility] [id number]          Thanks so much for taking the first step towards better health. This letter is to inform you that we have received your enrollment form for the [facility] Well With Diabetes Program, but you are missing the following requirements or documentation:     1. Diagnosis of Diabetes Type One or Two - as per documentation from your Provider  2. Provider Visit for DM within the last 12 months  3. A1C Result within the last 12 months     To qualify for this program the above requirements must be met by 5/25/21 for enrollment into the program on 6/01/22. Results or visits obtained outside of [facility] [place] will need to be provided by fax: [phone] or email: [email or web address] before the deadline in order to qualify for the program.     If requirements are not met by the date listed above, you will be not be enrolled in the program.     Stephane 2  Phone: toll free [phone] Option #3  Email: [email or web address]  Fax Number: [phone]

## 2022-05-16 NOTE — PROGRESS NOTES
Pharmacy Pop Care Documentation:   Patient is missing the following requirements: DX: DM Type One or Type Two;  Provider Documentation of DM Visit; A1C. If completed by 5/25/22 patient will be eligible for enrollment in the DM Program on 6/01/22. Application Received: via [employer]. Letter mailed to patient.     Evon Chau, [address] [facility]   Department, toll free: [phone] Option #3

## 2023-02-16 ENCOUNTER — HOSPITAL ENCOUNTER (OUTPATIENT)
Dept: GENERAL RADIOLOGY | Age: 63
Discharge: HOME | End: 2023-02-16
Payer: COMMERCIAL

## 2023-02-16 ENCOUNTER — TRANSCRIBE ORDER (OUTPATIENT)
Dept: REGISTRATION | Age: 63
End: 2023-02-16

## 2023-02-16 DIAGNOSIS — Z00.00 ROUTINE GENERAL MEDICAL EXAMINATION AT A HEALTH CARE FACILITY: ICD-10-CM

## 2023-02-16 DIAGNOSIS — Z00.00 ROUTINE GENERAL MEDICAL EXAMINATION AT A HEALTH CARE FACILITY: Primary | ICD-10-CM

## 2023-02-16 PROCEDURE — 71046 X-RAY EXAM CHEST 2 VIEWS: CPT

## 2023-04-30 RX ORDER — ISOSORBIDE MONONITRATE 30 MG/1
TABLET, EXTENDED RELEASE ORAL DAILY
COMMUNITY

## 2023-04-30 RX ORDER — ATORVASTATIN CALCIUM 80 MG/1
TABLET, FILM COATED ORAL
COMMUNITY

## 2023-04-30 RX ORDER — LISINOPRIL 10 MG/1
TABLET ORAL DAILY
COMMUNITY

## 2023-04-30 RX ORDER — LANOLIN ALCOHOL/MO/W.PET/CERES
CREAM (GRAM) TOPICAL
COMMUNITY

## 2023-09-15 ENCOUNTER — TRANSCRIBE ORDERS (OUTPATIENT)
Dept: SCHEDULING | Age: 63
End: 2023-09-15

## 2023-09-15 DIAGNOSIS — Z12.39 ENCOUNTER FOR OTHER SCREENING FOR MALIGNANT NEOPLASM OF BREAST: Primary | ICD-10-CM

## 2023-09-22 ENCOUNTER — HOSPITAL ENCOUNTER (OUTPATIENT)
Facility: HOSPITAL | Age: 63
Discharge: HOME | End: 2023-09-22
Payer: COMMERCIAL

## 2023-09-22 ENCOUNTER — HOSPITAL ENCOUNTER (OUTPATIENT)
Facility: HOSPITAL | Age: 63
End: 2023-09-22
Payer: COMMERCIAL

## 2023-09-22 VITALS — WEIGHT: 170 LBS | BODY MASS INDEX: 34.27 KG/M2 | HEIGHT: 59 IN

## 2023-09-22 DIAGNOSIS — Z78.0 ASYMPTOMATIC MENOPAUSAL STATE: ICD-10-CM

## 2023-09-22 DIAGNOSIS — Z12.31 VISIT FOR SCREENING MAMMOGRAM: ICD-10-CM

## 2023-09-22 PROCEDURE — 77063 BREAST TOMOSYNTHESIS BI: CPT

## 2023-09-22 PROCEDURE — 77080 DXA BONE DENSITY AXIAL: CPT

## 2024-03-14 ENCOUNTER — TRANSCRIBE ORDERS (OUTPATIENT)
Facility: HOSPITAL | Age: 64
End: 2024-03-14

## 2024-03-14 DIAGNOSIS — G44.52 NEW DAILY PERSISTENT HEADACHE: Primary | ICD-10-CM

## 2024-03-22 ENCOUNTER — HOSPITAL ENCOUNTER (OUTPATIENT)
Facility: HOSPITAL | Age: 64
End: 2024-03-22
Attending: FAMILY MEDICINE
Payer: MEDICARE

## 2024-03-22 DIAGNOSIS — G44.52 NEW DAILY PERSISTENT HEADACHE: ICD-10-CM

## 2024-03-22 PROCEDURE — 70450 CT HEAD/BRAIN W/O DYE: CPT

## 2024-05-13 DIAGNOSIS — J01.00 SUBACUTE MAXILLARY SINUSITIS: Primary | ICD-10-CM

## 2024-05-13 RX ORDER — AMOXICILLIN 500 MG/1
500 CAPSULE ORAL 3 TIMES DAILY
Qty: 21 CAPSULE | Refills: 0 | Status: SHIPPED | OUTPATIENT
Start: 2024-05-13 | End: 2024-05-20

## 2025-02-20 RX ORDER — ASPIRIN 81 MG/1
81 TABLET ORAL DAILY
COMMUNITY

## 2025-02-20 RX ORDER — METOPROLOL SUCCINATE 25 MG/1
25 TABLET, EXTENDED RELEASE ORAL DAILY
COMMUNITY

## 2025-02-20 RX ORDER — ISOSORBIDE MONONITRATE 30 MG/1
15 TABLET, EXTENDED RELEASE ORAL DAILY
COMMUNITY

## 2025-02-24 ENCOUNTER — ANESTHESIA EVENT (OUTPATIENT)
Facility: HOSPITAL | Age: 65
End: 2025-02-24
Payer: MEDICARE

## 2025-02-24 ENCOUNTER — HOSPITAL ENCOUNTER (OUTPATIENT)
Facility: HOSPITAL | Age: 65
Setting detail: OUTPATIENT SURGERY
Discharge: HOME | End: 2025-02-24
Attending: INTERNAL MEDICINE | Admitting: INTERNAL MEDICINE
Payer: MEDICARE

## 2025-02-24 ENCOUNTER — ANESTHESIA (OUTPATIENT)
Facility: HOSPITAL | Age: 65
End: 2025-02-24
Payer: MEDICARE

## 2025-02-24 VITALS
RESPIRATION RATE: 15 BRPM | WEIGHT: 173 LBS | HEIGHT: 59 IN | SYSTOLIC BLOOD PRESSURE: 113 MMHG | HEART RATE: 60 BPM | TEMPERATURE: 97.3 F | OXYGEN SATURATION: 100 % | BODY MASS INDEX: 34.88 KG/M2 | DIASTOLIC BLOOD PRESSURE: 30 MMHG

## 2025-02-24 PROCEDURE — 3700000001 HC ADD 15 MINUTES (ANESTHESIA): Performed by: INTERNAL MEDICINE

## 2025-02-24 PROCEDURE — 6360000002 HC RX W HCPCS

## 2025-02-24 PROCEDURE — 2580000003 HC RX 258

## 2025-02-24 PROCEDURE — 88305 TISSUE EXAM BY PATHOLOGIST: CPT

## 2025-02-24 PROCEDURE — 3600007502: Performed by: INTERNAL MEDICINE

## 2025-02-24 PROCEDURE — 2709999900 HC NON-CHARGEABLE SUPPLY: Performed by: INTERNAL MEDICINE

## 2025-02-24 PROCEDURE — 7100000010 HC PHASE II RECOVERY - FIRST 15 MIN: Performed by: INTERNAL MEDICINE

## 2025-02-24 PROCEDURE — 7100000011 HC PHASE II RECOVERY - ADDTL 15 MIN: Performed by: INTERNAL MEDICINE

## 2025-02-24 PROCEDURE — 3700000000 HC ANESTHESIA ATTENDED CARE: Performed by: INTERNAL MEDICINE

## 2025-02-24 PROCEDURE — 3600007512: Performed by: INTERNAL MEDICINE

## 2025-02-24 RX ORDER — LIDOCAINE HYDROCHLORIDE 20 MG/ML
INJECTION, SOLUTION EPIDURAL; INFILTRATION; INTRACAUDAL; PERINEURAL
Status: DISCONTINUED | OUTPATIENT
Start: 2025-02-24 | End: 2025-02-24 | Stop reason: SDUPTHER

## 2025-02-24 RX ORDER — SODIUM CHLORIDE 9 MG/ML
INJECTION, SOLUTION INTRAVENOUS PRN
Status: DISCONTINUED | OUTPATIENT
Start: 2025-02-24 | End: 2025-02-24 | Stop reason: HOSPADM

## 2025-02-24 RX ORDER — SODIUM CHLORIDE 0.9 % (FLUSH) 0.9 %
5-40 SYRINGE (ML) INJECTION PRN
Status: DISCONTINUED | OUTPATIENT
Start: 2025-02-24 | End: 2025-02-24 | Stop reason: HOSPADM

## 2025-02-24 RX ORDER — SODIUM CHLORIDE 9 MG/ML
INJECTION, SOLUTION INTRAVENOUS
Status: DISCONTINUED | OUTPATIENT
Start: 2025-02-24 | End: 2025-02-24 | Stop reason: SDUPTHER

## 2025-02-24 RX ORDER — SODIUM CHLORIDE 0.9 % (FLUSH) 0.9 %
5-40 SYRINGE (ML) INJECTION EVERY 12 HOURS SCHEDULED
Status: DISCONTINUED | OUTPATIENT
Start: 2025-02-24 | End: 2025-02-24 | Stop reason: HOSPADM

## 2025-02-24 RX ADMIN — PROPOFOL 20 MG: 10 INJECTION, EMULSION INTRAVENOUS at 08:02

## 2025-02-24 RX ADMIN — SODIUM CHLORIDE: 9 INJECTION, SOLUTION INTRAVENOUS at 07:44

## 2025-02-24 RX ADMIN — PROPOFOL 50 MG: 10 INJECTION, EMULSION INTRAVENOUS at 07:51

## 2025-02-24 RX ADMIN — PROPOFOL 20 MG: 10 INJECTION, EMULSION INTRAVENOUS at 08:05

## 2025-02-24 RX ADMIN — PROPOFOL 50 MG: 10 INJECTION, EMULSION INTRAVENOUS at 07:58

## 2025-02-24 RX ADMIN — LIDOCAINE HYDROCHLORIDE 50 MG: 20 INJECTION, SOLUTION EPIDURAL; INFILTRATION; INTRACAUDAL; PERINEURAL at 07:46

## 2025-02-24 RX ADMIN — PROPOFOL 60 MG: 10 INJECTION, EMULSION INTRAVENOUS at 07:48

## 2025-02-24 RX ADMIN — PROPOFOL 50 MG: 10 INJECTION, EMULSION INTRAVENOUS at 07:54

## 2025-02-24 ASSESSMENT — PAIN - FUNCTIONAL ASSESSMENT: PAIN_FUNCTIONAL_ASSESSMENT: 0-10

## 2025-02-24 NOTE — OP NOTE
NAME:  Serena Carrion   :   1960   MRN:   [id number]     Date/Time:  2025 8:12 AM    Colonoscopy Operative Report    Procedure Type:   Colonoscopy with polypectomy (cold snare)     Indications:     Personal history of colon polyps (screening only)  Pre-operative Diagnosis: see indication above  Post-operative Diagnosis:  See findings below  :  Russell Russo MD  Referring Provider: -Elias Tuttle MD    Exam:  Airway: clear, no airway problems anticipated  Heart: RRR, without gallops or rubs  Lungs: clear bilaterally without wheezes, crackles, or rhonchi  Abdomen: soft, nontender, nondistended, bowel sounds present  Mental Status: awake, alert and oriented to person, place and time    Sedation:  MAC anesthesia Propofol 259mg IV  Procedure Details:  After informed consent was obtained with all risks and benefits of procedure explained and preoperative exam completed, the patient was taken to the endoscopy suite and placed in the left lateral decubitus position.  Upon sequential sedation as per above, a digital rectal exam was performed demonstrating internal hemorrhoids.  The Olympus videocolonoscope  was inserted in the rectum and carefully advanced to the cecum, which was identified by the ileocecal valve and appendiceal orifice.   The quality of preparation was adequate.  The colonoscope was slowly withdrawn with careful evaluation between folds. Retroflexion in the rectum was completed demonstrating internal hemorrhoids.     Findings:     -Single diminutive benign-appearing 3mm sessile polyp in the ascending colon at 88cm; removed with cold snare  -Two diminutive benign-appearing 3mm sessile polyps in the transverse colon at 70cm; removed with cold snare  -Sigmoid diverticulosis  -Small grade 1 internal hemorrhoids    Specimen Removed:  #1 asc colon polyp: #2 transverse colon polyps  Complications: None.   EBL:  None.    Impression:    -Single diminutive benign-appearing 3mm sessile polyp in

## 2025-02-24 NOTE — H&P
Gastroenterology Outpatient History and Physical    Patient: Serena Carrion    Physician: Russell Russo MD    Chief Complaint: Pers hx colon polyps  History of Present Illness: 64yo F with Pers hx colon polyps.  Last polyps in .  None on colonoscop  or .  Off Xarelto x3d    History:  Past Medical History:   Diagnosis Date    Arrhythmia     hx A-fib    Arthritis     CAD (coronary artery disease)     CABG     Chronic kidney disease     GERD (gastroesophageal reflux disease)     Heart failure (HCC)     Hypertension     Other ill-defined conditions(799.89)     epidural injections    Pacemaker 2018    dual chamber    Psychiatric disorder     anxiety/depression, years ago per patient    PVD (peripheral vascular disease)     left iliac stent    Right thyroid nodule 2014    stable      Past Surgical History:   Procedure Laterality Date    BACK SURGERY      microdiscectomy  L4-L5, and in     CABG, ARTERY-VEIN, THREE  2010    CARDIAC DEFIBRILLATOR PLACEMENT  2011    removed in 2018    COLONOSCOPY,DIAGNOSTIC  3/6/2015         ENDOMETRIAL ABLATION      GYN      D&C    GYN       x3    HEENT      septoplasty    ORTHOPEDIC SURGERY      surgery on right hand with tendon cut and repaired    OTHER SURGICAL HISTORY      EGD-Carter's Esophagus    PACEMAKER      TONSILLECTOMY      TUBAL LIGATION      VASCULAR SURGERY  2021    angioplasty and stenting-left brachial cutdown      Social History     Socioeconomic History    Marital status:      Spouse name: None    Number of children: None    Years of education: None    Highest education level: None   Tobacco Use    Smoking status: Former     Types: Cigarettes     Start date:      Quit date:      Years since quittin.1    Smokeless tobacco: Never   Vaping Use    Vaping status: Never Used   Substance and Sexual Activity    Alcohol use: Yes     Alcohol/week: 1.0 standard drink of alcohol     Types: 1 Shots of liquor per

## 2025-02-24 NOTE — ANESTHESIA POSTPROCEDURE EVALUATION
Department of Anesthesiology  Postprocedure Note    Patient: Serena Carrion  MRN: [id number]  YOB: 1960  Date of evaluation: 2/24/2025    Procedure Summary       Date: 02/24/25 Room / Location: [facility] ENDO 01 / [facility] ENDOSCOPY    Anesthesia Start: 0744 Anesthesia Stop: 0810    Procedure: COLONOSCOPY (Lower GI Region) Diagnosis:       Chronic anticoagulation      Hx of colonic polyps      Benign neoplasm of ascending colon      Benign neoplasm of transverse colon      Diverticulosis of colon (without mention of hemorrhage)      First degree hemorrhoids      (Chronic anticoagulation [Z79.01])      (Hx of colonic polyps [Z86.0100])    Surgeons: Russell Russo MD Responsible Provider: Dallin Delong MD    Anesthesia Type: MAC ASA Status: 3            Anesthesia Type: MAC    Lelo Phase I: Lelo Score: 10    Lelo Phase II: Lelo Score: 10    Anesthesia Post Evaluation    Patient location during evaluation: PACU  Patient participation: complete - patient participated  Level of consciousness: awake  Airway patency: patent  Nausea & Vomiting: no vomiting  Cardiovascular status: hemodynamically stable  Respiratory status: acceptable  Hydration status: euvolemic    No notable events documented.

## 2025-02-24 NOTE — ANESTHESIA PRE PROCEDURE
Department of Anesthesiology  Preprocedure Note       Name:  Serena Carrion   Age:  65 y.o.  :  1960                                          MRN:  [id number]         Date:  2025      Surgeon: Surgeon(s):  Russell Russo MD    Procedure: Procedure(s):  COLONOSCOPY    Medications prior to admission:   Prior to Admission medications    Medication Sig Start Date End Date Taking? Authorizing Provider   isosorbide mononitrate (IMDUR) 30 MG extended release tablet Take 0.5 tablets by mouth daily   Yes Provider, MD Jessica   metoprolol succinate (TOPROL XL) 25 MG extended release tablet Take 1 tablet by mouth daily   Yes Provider, MD Jessica   aspirin 81 MG EC tablet Take 1 tablet by mouth daily   Yes Provider, MD Jessica   diphenhydrAMINE-APAP, sleep, (TYLENOL PM EXTRA STRENGTH PO) Take by mouth nightly as needed   Yes Provider, MD Jessica   atorvastatin (LIPITOR) 80 MG tablet Take by mouth daily    Automatic Reconciliation, Ar   vitamin D (CHOLECALCIFEROL) 25 MCG (1000 UT) TABS tablet Take by mouth daily    Automatic Reconciliation, Ar   lisinopril (PRINIVIL;ZESTRIL) 10 MG tablet Take by mouth daily    Automatic Reconciliation, Ar   melatonin 3 MG TABS tablet Take 1 mg by mouth nightly as needed    Automatic Reconciliation, Ar   rivaroxaban (XARELTO) 2.5 MG TABS tablet Take by mouth 2 times daily 3/5/22   Automatic Reconciliation, Ar       Current medications:    No current facility-administered medications for this encounter.       Allergies:  No Known Allergies    Problem List:    Patient Active Problem List   Diagnosis Code   • Borderline hypertension R03.0   • Leg pain, right M79.604   • Carotid stenosis, asymptomatic, left I65.22   • PVD (peripheral vascular disease) I73.9   • Right thyroid nodule E04.1   • Hypotension, unspecified I95.9   • Sciatica M54.30   • CAD (coronary artery disease) I25.10   • Chronic mesenteric ischemia K55.1   • Acute CHF (HCC) I50.9   • S/P CABG (coronary artery

## 2025-02-24 NOTE — DISCHARGE INSTRUCTIONS
hemorrhoids    Follow-up Instructions:   Call Dr. Russo for the results of procedure / biopsy in 7-10 days   Telephone # [phone]  Repeat upper endoscopy in 5 years  Resume Xarelto tomorrow    Russell Russo MD     Patient Education on Sedation / Analgesia Administered for Procedure      For 24 hours after general anesthesia or intravenous analgesia / sedation:  Have someone responsible help you with your care  Limit your activities  Do not drive and operate hazardous machinery  Do not make important personal, legal or business decisions  Do not drink alcoholic beverages  If you have not urinated within 8 hours after discharge, please contact your physician  Resume your medications unless otherwise instructed    You may not be aware of slight changes in your behavior and/or your reaction time because of the medication used during and after your procedure.    Report the following to your physician:  Excessive pain, swelling, redness or odor of or around the surgical area  Temperature over 100.5  Nausea and vomiting lasting longer than 4 hours or if unable to take medications  Any signs of decreased circulation or nerve impairment to extremity: change in color, persistent numbness, tingling, coldness or increase pain  Any questions or concerns    IF YOU REPORT TO AN EMERGENCY ROOM, DOCTOR'S OFFICE OR HOSPITAL WITHIN 24 HOURS AFTER YOUR PROCEDURE, BRING THIS SHEET AND YOUR AFTER VISIT SUMMARY WITH YOU AND GIVE IT TO THE PHYSICIAN OR NURSE ATTENDING YOU.

## 2025-02-24 NOTE — PERIOP NOTE
ARRIVAL INFORMATION:  Verified patient name and date of birth, scheduled procedure, and informed consent.     : Austen fierro contact number: [phone]  Physician and staff can share information with the .     Receive texts: no    Belongings with patient include:  Clothing,None    GI FOCUSED ASSESSMENT:  Neuro: Awake, alert, oriented x4  Respiratory: even and unlabored   GI: soft and non-distended  EKG Rhythm: paced    Education:Reviewed general discharge instructions and  information.      Scope pre cleaned by Tosha HUFFMAN

## 2025-04-14 ENCOUNTER — TRANSCRIBE ORDERS (OUTPATIENT)
Facility: HOSPITAL | Age: 65
End: 2025-04-14

## 2025-04-14 DIAGNOSIS — M54.42 RIGHT-SIDED LOW BACK PAIN WITH BILATERAL SCIATICA, UNSPECIFIED CHRONICITY: ICD-10-CM

## 2025-04-14 DIAGNOSIS — M47.816 LUMBAR SPONDYLOSIS: ICD-10-CM

## 2025-04-14 DIAGNOSIS — M16.11 PRIMARY OSTEOARTHRITIS OF RIGHT HIP: Primary | ICD-10-CM

## 2025-04-14 DIAGNOSIS — Z79.01 CURRENT USE OF LONG TERM ANTICOAGULATION: ICD-10-CM

## 2025-04-14 DIAGNOSIS — M51.360 DISCOGENIC LOW BACK PAIN: ICD-10-CM

## 2025-04-14 DIAGNOSIS — M54.41 RIGHT-SIDED LOW BACK PAIN WITH BILATERAL SCIATICA, UNSPECIFIED CHRONICITY: ICD-10-CM

## 2025-08-19 ENCOUNTER — HOSPITAL ENCOUNTER (OUTPATIENT)
Facility: HOSPITAL | Age: 65
Discharge: HOME | End: 2025-08-22

## 2025-08-19 LAB
ALBUMIN SERPL-MCNC: 3.5 G/DL (ref 3.5–5.2)
ALBUMIN/GLOB SERPL: 1.4 (ref 1.1–2.2)
ALP SERPL-CCNC: 70 U/L (ref 35–104)
ALT SERPL-CCNC: 14 U/L (ref 10–35)
ANION GAP SERPL CALC-SCNC: 11 MMOL/L (ref 2–14)
AST SERPL-CCNC: 18 U/L (ref 10–35)
BILIRUB SERPL-MCNC: 0.3 MG/DL (ref 0–1.2)
BUN SERPL-MCNC: 16 MG/DL (ref 8–23)
BUN/CREAT SERPL: 10 (ref 12–20)
CALCIUM SERPL-MCNC: 9.6 MG/DL (ref 8.8–10.2)
CHLORIDE SERPL-SCNC: 107 MMOL/L (ref 98–107)
CHOLEST SERPL-MCNC: 119 MG/DL (ref 0–200)
CO2 SERPL-SCNC: 24 MMOL/L (ref 20–29)
CREAT SERPL-MCNC: 1.65 MG/DL (ref 0.6–1)
GLOBULIN SER CALC-MCNC: 2.5 G/DL (ref 2–4)
GLUCOSE SERPL-MCNC: 88 MG/DL (ref 65–100)
HDLC SERPL-MCNC: 45 MG/DL (ref 40–60)
HDLC SERPL: 2.7 (ref 0–5)
LDLC SERPL CALC-MCNC: 48 MG/DL (ref 0–100)
POTASSIUM SERPL-SCNC: 5 MMOL/L (ref 3.5–5.1)
PROT SERPL-MCNC: 6.1 G/DL (ref 6.4–8.3)
SODIUM SERPL-SCNC: 142 MMOL/L (ref 136–145)
TRIGL SERPL-MCNC: 133 MG/DL (ref 0–150)
VLDLC SERPL CALC-MCNC: 27 MG/DL

## (undated) DEVICE — TIP SUCT TRNSPAR RIB SURF STD BLB RIG NVENT W/ 5IN1 CONN DYND50138] MEDLINE INDUSTRIES INC]

## (undated) DEVICE — NEEDLE HYPO 18GA L1.5IN PNK S STL HUB POLYPR SHLD REG BVL

## (undated) DEVICE — SHEAR RMFG HARMONIC FOCUS 9CM -- OEM ITEM L#322125

## (undated) DEVICE — SOL INJ SOD CL 0.9% 500ML BG --

## (undated) DEVICE — SPONGE GZ W4XL4IN COT RADPQ HIGHLY ABSRB

## (undated) DEVICE — HANDLE LT SNAP ON ULT DURABLE LENS FOR TRUMPF ALC DISPOSABLE

## (undated) DEVICE — BLADE OPHTH 180DEG CUT SURF BLU STR SHRP DBL BVL GRINDLESS

## (undated) DEVICE — SET GRAV CK VLV NEEDLESS ST 3 GANGED 4WAY STPCOCK HI FLO 10

## (undated) DEVICE — VESSEL LOOPS,MAXI, BLUE: Brand: DEVON

## (undated) DEVICE — VALVE INSRT TOOL ADPT MTL 9FR -- ACCESS

## (undated) DEVICE — BLANKET WRM W25XL64IN NONWOVEN SFT LTWT PLIABLE HYPR

## (undated) DEVICE — COVER,TABLE,HEAVY DUTY,77"X90",STRL: Brand: MEDLINE

## (undated) DEVICE — SPONGE GZ W4XL4IN COT 12 PLY TYP VII WVN C FLD DSGN

## (undated) DEVICE — SUTURE GORTX SZ 4-0 L24IN NONABSORBABLE L13MM PT-13 3/8 CIR 5K08A

## (undated) DEVICE — APPLICATOR BNDG 1MM ADH PREMIERPRO EXOFIN

## (undated) DEVICE — SUT PROL 6-0 24IN BV1 DA BLU --

## (undated) DEVICE — (D)STRIP SKN CLSR 0.5X4IN WHT --

## (undated) DEVICE — Device

## (undated) DEVICE — DERMABOND SKIN ADH 0.7ML -- DERMABOND ADVANCED 12/BX

## (undated) DEVICE — CUFF BLD PRSS AD CLTH SGL TB W/ BAYNT CONN ROUNDED CORNER

## (undated) DEVICE — GOWN,PREVENTION PLUS,XLN/2XL,ST,22/CS: Brand: MEDLINE

## (undated) DEVICE — SUTURE MCRYL SZ 4-0 L27IN ABSRB UD L19MM PS-2 1/2 CIR PRIM Y426H

## (undated) DEVICE — AGENT HEMSTAT W4XL4IN OXIDIZED REGENERATED CELOS ABSRB SFT

## (undated) DEVICE — SUT PROL 7-0 24IN BV1 DA BLU --

## (undated) DEVICE — ENDOSCOPIC KIT COMPLIANCE ENDOKIT

## (undated) DEVICE — SUT PROL 7-0 18IN BV1 DA BLU --

## (undated) DEVICE — PTA BALLOON DILATATION CATHETER: Brand: COYOTE™ ES

## (undated) DEVICE — INTRODUCER SHTH 6FR CANN L11CM DIL TIP 35MM GRN TUNGSTEN

## (undated) DEVICE — SHEATH INTRO 180 DEG 7 FRX55 CM STEER FLSH PRT TOURGUIDE

## (undated) DEVICE — DRAIN SURG W10MMXL20CM SIL FULL PERF HUBLESS FLAT RADPQ

## (undated) DEVICE — GOWN,SIRUS,NONRNF,SETINSLV,2XL,18/CS: Brand: MEDLINE

## (undated) DEVICE — SYRINGE ANGIO CNTRST DEL 20 CC POLYCARB LIGHT GRN MEDALLION

## (undated) DEVICE — CATHETER ETER ANGIO L100CM OD6FR ID0054IN TIP L33CM GWIRE

## (undated) DEVICE — INFECTION CONTROL KIT SYS

## (undated) DEVICE — NEEDLE HYPO 25GA L5/8IN ORNG HUB S STL LATCH BVL UP

## (undated) DEVICE — REM POLYHESIVE ADULT PATIENT RETURN ELECTRODE: Brand: VALLEYLAB

## (undated) DEVICE — Z DISCONTINUED USE 2131664 WIPE INSTR W3XL3IN NONLINTING

## (undated) DEVICE — Device: Brand: GRAND SLAM

## (undated) DEVICE — IV START KIT: Brand: MEDLINE

## (undated) DEVICE — AEGIS 1" DISK 4MM HOLE, PEEL OPEN: Brand: MEDLINE

## (undated) DEVICE — CATHETER ANGIO 5FR L100CM GWIRE 0.038IN BERN NONBRAIDED HI

## (undated) DEVICE — GLOVE SURG SZ 8 L12IN FNGR THK94MIL STD WHT LTX FREE

## (undated) DEVICE — LABEL MED VASC MRMC STRL

## (undated) DEVICE — INFLATION DEVICE: Brand: ENCORE™ 26

## (undated) DEVICE — TRAP ENDOSCP POLYP 2 CHMBR DRAWER TYP

## (undated) DEVICE — SYR 3ML LL TIP 1/10ML GRAD --

## (undated) DEVICE — LABEL MED CARD MRMC STRL

## (undated) DEVICE — DRAPE PRB US TRNSDCR 6X96IN --

## (undated) DEVICE — RADIFOCUS GLIDEWIRE: Brand: GLIDEWIRE

## (undated) DEVICE — SPONGE: SPECIALTY PEANUT XR 100/CS: Brand: MEDICAL ACTION INDUSTRIES

## (undated) DEVICE — SUTURE VCRL SZ 3-0 L27IN ABSRB UD L26MM SH 1/2 CIR J416H

## (undated) DEVICE — CAROTID ARTERY SHUNT KIT,RADIOPAQUE LINE, STRAIGHT: Brand: ARGYLE

## (undated) DEVICE — SUTURE PERMAHAND SZ 2-0 L30IN NONABSORBABLE BLK SILK W/O A305H

## (undated) DEVICE — 3M™ TEGADERM™ TRANSPARENT FILM DRESSING FRAME STYLE, 1626W, 4 IN X 4-3/4 IN (10 CM X 12 CM), 50/CT 4CT/CASE: Brand: 3M™ TEGADERM™

## (undated) DEVICE — CATHETER ANGIO 5FR L65CM 0.035IN VIS OMNI FLUSH-0 SFT TIP

## (undated) DEVICE — GUIDE CATHETER: Brand: RUNWAY™

## (undated) DEVICE — STERILE COTTON BALLS LARGE 5/P: Brand: MEDLINE

## (undated) DEVICE — Device: Brand: JELCO

## (undated) DEVICE — VASCULAR-RICHMOND-LF: Brand: MEDLINE INDUSTRIES, INC.

## (undated) DEVICE — STERILE POLYISOPRENE POWDER-FREE SURGICAL GLOVES: Brand: PROTEXIS

## (undated) DEVICE — PROBE VASC 8MHZ WTRPRF

## (undated) DEVICE — COVER,TABLE,60X90,STERILE: Brand: MEDLINE

## (undated) DEVICE — RING BASIN GUIDEWIRE BOWL: Brand: MEDLINE INDUSTRIES, INC.

## (undated) DEVICE — ROSEN CURVED WIRE GUIDE: Brand: ROSEN

## (undated) DEVICE — TRAP SPEC POLYP REM STRNR CLN DSGN MAGNIFYING WIND DISP

## (undated) DEVICE — SOLUTION IV 500ML 0.9% SOD CHL FLX CONT

## (undated) DEVICE — PREP SKN CHLRAPRP APL 26ML STR --

## (undated) DEVICE — SUTURE VCRL SZ 3-0 L27IN ABSRB UD L36MM CT-1 1/2 CIR J258H

## (undated) DEVICE — Device: Brand: VISIONS PV .014P RX DIGITAL IVUS CATHETER

## (undated) DEVICE — SUTURE PROL SZ 5-0 L24IN NONABSORBABLE BLU RB-2 L13IN 1/2 8554H

## (undated) DEVICE — MICROPUNCTURE INTRODUCER SET SILHOUETTE TRANSITIONLESS WITH STAINLESS STEEL WIRE GUIDE: Brand: MICROPUNCTURE

## (undated) DEVICE — (D)PREP SKN CHLRAPRP APPL 26ML -- CONVERT TO ITEM 371833

## (undated) DEVICE — MAGNETIC INSTR DRAPE 20X16: Brand: MEDLINE INDUSTRIES, INC.

## (undated) DEVICE — STRAP,POSITIONING,KNEE/BODY,FOAM,4X60": Brand: MEDLINE

## (undated) DEVICE — CATHETER ANGIO 5FR L100CM 0.038IN PERIPH HD HUNTER 1 SEL

## (undated) DEVICE — 3M™ DURAPORE™ SURGICAL TAPE 1538-1, 1 INCH X 10 YARD (2,5CM X 9,1M), 12 ROLLS/BOX: Brand: 3M™ DURAPORE™

## (undated) DEVICE — SNARE ENDOSCP POLYP MED STD AD 2.4X27X240 CM 2.8 MM OVL SENS

## (undated) DEVICE — SUTURE PERMAHAND SZ 3-0 L30IN NONABSORBABLE BLK SILK BRAID A304H

## (undated) DEVICE — PROVE COVER: Brand: UNBRANDED

## (undated) DEVICE — BLADE ASSEMB CLP HAIR FINE --

## (undated) DEVICE — SUTURE VCRL SZ 2-0 L36IN ABSRB VLT L36MM CT-1 1/2 CIR J345H

## (undated) DEVICE — OR HYBRID-MRMC: Brand: MEDLINE INDUSTRIES, INC.

## (undated) DEVICE — PERCLOSE PROGLIDE™ SUTURE-MEDIATED CLOSURE SYSTEM: Brand: PERCLOSE PROGLIDE™